# Patient Record
Sex: FEMALE | Race: AMERICAN INDIAN OR ALASKA NATIVE | NOT HISPANIC OR LATINO | Employment: UNEMPLOYED | URBAN - METROPOLITAN AREA
[De-identification: names, ages, dates, MRNs, and addresses within clinical notes are randomized per-mention and may not be internally consistent; named-entity substitution may affect disease eponyms.]

---

## 2021-09-21 ENCOUNTER — OFFICE VISIT (OUTPATIENT)
Dept: FAMILY MEDICINE CLINIC | Facility: CLINIC | Age: 46
End: 2021-09-21
Payer: COMMERCIAL

## 2021-09-21 VITALS
HEIGHT: 66 IN | WEIGHT: 229.6 LBS | SYSTOLIC BLOOD PRESSURE: 122 MMHG | OXYGEN SATURATION: 98 % | HEART RATE: 87 BPM | RESPIRATION RATE: 16 BRPM | BODY MASS INDEX: 36.9 KG/M2 | DIASTOLIC BLOOD PRESSURE: 80 MMHG | TEMPERATURE: 98.7 F

## 2021-09-21 DIAGNOSIS — L50.9 HIVES: ICD-10-CM

## 2021-09-21 DIAGNOSIS — M25.50 MULTIPLE JOINT PAIN: Primary | ICD-10-CM

## 2021-09-21 DIAGNOSIS — Z80.8 FAMILY HISTORY OF THYROID CANCER: ICD-10-CM

## 2021-09-21 DIAGNOSIS — R10.9 ABDOMINAL PAIN, UNSPECIFIED ABDOMINAL LOCATION: ICD-10-CM

## 2021-09-21 PROCEDURE — 99204 OFFICE O/P NEW MOD 45 MIN: CPT | Performed by: FAMILY MEDICINE

## 2021-09-21 PROCEDURE — 3725F SCREEN DEPRESSION PERFORMED: CPT | Performed by: FAMILY MEDICINE

## 2021-09-21 PROCEDURE — 3008F BODY MASS INDEX DOCD: CPT | Performed by: FAMILY MEDICINE

## 2021-09-21 RX ORDER — METHYLPREDNISOLONE 4 MG/1
TABLET ORAL
Qty: 21 EACH | Refills: 0 | Status: SHIPPED | OUTPATIENT
Start: 2021-09-21 | End: 2021-10-05

## 2021-09-21 RX ORDER — CETIRIZINE HYDROCHLORIDE 10 MG/1
10 TABLET ORAL DAILY
COMMUNITY
End: 2021-10-27

## 2021-09-21 RX ORDER — PANTOPRAZOLE SODIUM 40 MG/1
40 TABLET, DELAYED RELEASE ORAL
Qty: 90 TABLET | Refills: 3 | Status: SHIPPED | OUTPATIENT
Start: 2021-09-21 | End: 2022-03-28

## 2021-09-21 NOTE — PROGRESS NOTES
Solo Chavarria 1975 female MRN: [id number]    [facility]      ASSESSMENT/PLAN  oSlo Chavarria is a 42 y o  female presents to the office for     Diagnoses and all orders for this visit:    Multiple joint pain  -     Sedimentation rate, automated; Future  -     Comprehensive metabolic panel; Future  -     CBC and differential; Future  -     LUCY Screen w/ Reflex to Titer/Pattern; Future    Abdominal pain, unspecified abdominal location  -     Comprehensive metabolic panel; Future  -     CBC and differential; Future  -     Ambulatory referral to Gastroenterology; Future  -     US abdomen complete; Future  -     pantoprazole (PROTONIX) 40 mg tablet; Take 1 tablet (40 mg total) by mouth daily before breakfast    Hives  -     Ambulatory referral to Dermatology; Future  -     methylPREDNISolone 4 MG tablet therapy pack; Use as directed on package    Family history of thyroid cancer  -     TSH, 3rd generation with Free T4 reflex; Future    Other orders  -     cetirizine (ZyrTEC) 10 mg tablet; Take 10 mg by mouth daily      Multiple joint pain-> r/o Autoimmune  Hive: COVID latent hives? Advised that I have had multiple patients with this  recommend short course of steriods, and continue zyrtec  No improvement then recommend Derm eval  Abdominal pain: Gallbladder vs Gastritis vs PUD  Started on Protonix         Health Maintence:  BMI Counseling: Body mass index is 37 06 kg/m²  The BMI is above normal  Nutrition recommendations include consuming healthier snacks  Rationale for BMI follow-up plan is due to patient being overweight or obese  Depression Screening and Follow-up Plan:   Patient was screened for depression during today's encounter  They screened negative with a PHQ-2 score of 2            Disposition: Return to the office in 2 weeks   Future Appointments   Date Time Provider Coco Monsalve   10/4/2021  8:00  [address] [facility]   10/5/2021 10:15 AM Ruslan Laureano MD [facility]   10/27/2021  9:30 AM Gregorio Morgan PA-C [facility]          SUBJECTIVE  CC: Abdominal Pain (abdominal pain after eating, has diarrhea and gas) and Urticaria (also complains of hives on-going for 2 months)      HPI:  Juan Miguel Jeff is a 42 y o  femalepresenting to the office for a follow up on  Over 2 months today,  Starts at the back of her neck to the lower back  Only in the evening  Base of the neck and mid back, inner thighs  Bilateral cheek-> December> on and off for four years  Been more persistent for  2 years  Pimple and blisters  OTC Benadryl, Zyrtec    Stomach every now and then since 3rd baby  12 years ago had a problem with banana  March had a problem with banana and then stopped it after 3 days because it got stuck in epigastric  3 attacks-> 3rd was the worse felt that she was going to pass out  OTC nothing  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      Pain under thr right breast  Sore spot on the back of head  IUD-> Mirena  Vitamins    Review of Systems   Constitutional: Positive for appetite change  Negative for activity change, chills, fatigue and fever  HENT: Negative for congestion  Respiratory: Negative for cough, chest tightness and shortness of breath  Cardiovascular: Negative for chest pain and leg swelling  Gastrointestinal: Positive for abdominal pain  Negative for abdominal distention, constipation, diarrhea, nausea and vomiting  Musculoskeletal: Positive for arthralgias and myalgias  Skin: Positive for rash  All other systems reviewed and are negative  Historical Information   The patient history was reviewed as follows:    History reviewed  No pertinent past medical history    Past Surgical History:   Procedure Laterality Date    BREAST SURGERY       Family History   Problem Relation Age of Onset    Substance Abuse Maternal Aunt     Alcohol abuse Maternal Aunt     Pancreatic cancer Maternal Uncle       Social History Social History     Substance and Sexual Activity   Alcohol Use Not Currently     Social History     Substance and Sexual Activity   Drug Use Never     Social History     Tobacco Use   Smoking Status Never Smoker   Smokeless Tobacco Never Used       Medications:     Current Outpatient Medications:     cetirizine (ZyrTEC) 10 mg tablet, Take 10 mg by mouth daily, Disp: , Rfl:     methylPREDNISolone 4 MG tablet therapy pack, Use as directed on package, Disp: 21 each, Rfl: 0    pantoprazole (PROTONIX) 40 mg tablet, Take 1 tablet (40 mg total) by mouth daily before breakfast, Disp: 90 tablet, Rfl: 3  Allergies   Allergen Reactions    Sulfa Antibiotics Hives       OBJECTIVE    Vitals:   Vitals:    09/21/21 1228   BP: 122/80   BP Location: Left arm   Patient Position: Sitting   Cuff Size: Standard   Pulse: 87   Resp: 16   Temp: 98 7 °F (37 1 °C)   TempSrc: Temporal   SpO2: 98%   Weight: 104 kg (229 lb 9 6 oz)   Height: 5' 6" (1 676 m)           Physical Exam  Vitals reviewed  Constitutional:       Appearance: She is well-developed  HENT:      Head: Normocephalic and atraumatic  Eyes:      Conjunctiva/sclera: Conjunctivae normal       Pupils: Pupils are equal, round, and reactive to light  Cardiovascular:      Rate and Rhythm: Normal rate and regular rhythm  Heart sounds: Normal heart sounds  Pulmonary:      Effort: Pulmonary effort is normal  No respiratory distress  Breath sounds: Normal breath sounds  Abdominal:      Tenderness: There is no abdominal tenderness (not present today)  Musculoskeletal:         General: Normal range of motion  Cervical back: Normal range of motion and neck supple  Skin:     General: Skin is warm  Capillary Refill: Capillary refill takes less than 2 seconds  Neurological:      Mental Status: She is alert and oriented to person, place, and time              Cephus Meigs, MD  [facility]

## 2021-09-23 LAB
ALBUMIN SERPL-MCNC: 4.3 G/DL (ref 3.8–4.8)
ALBUMIN/GLOB SERPL: 1.7 {RATIO} (ref 1.2–2.2)
ALP SERPL-CCNC: 94 IU/L (ref 44–121)
ALT SERPL-CCNC: 27 IU/L (ref 0–32)
ANA SER QL: NEGATIVE
AST SERPL-CCNC: 23 IU/L (ref 0–40)
BASOPHILS # BLD AUTO: 0 X10E3/UL (ref 0–0.2)
BASOPHILS NFR BLD AUTO: 1 %
BILIRUB SERPL-MCNC: 0.3 MG/DL (ref 0–1.2)
BUN SERPL-MCNC: 8 MG/DL (ref 6–24)
BUN/CREAT SERPL: 10 (ref 9–23)
CALCIUM SERPL-MCNC: 8.9 MG/DL (ref 8.7–10.2)
CHLORIDE SERPL-SCNC: 104 MMOL/L (ref 96–106)
CO2 SERPL-SCNC: 23 MMOL/L (ref 20–29)
CREAT SERPL-MCNC: 0.84 MG/DL (ref 0.57–1)
EOSINOPHIL # BLD AUTO: 0.3 X10E3/UL (ref 0–0.4)
EOSINOPHIL NFR BLD AUTO: 6 %
ERYTHROCYTE [DISTWIDTH] IN BLOOD BY AUTOMATED COUNT: 13.2 % (ref 11.7–15.4)
ERYTHROCYTE [SEDIMENTATION RATE] IN BLOOD BY WESTERGREN METHOD: 14 MM/HR (ref 0–32)
GLOBULIN SER-MCNC: 2.6 G/DL (ref 1.5–4.5)
GLUCOSE SERPL-MCNC: 89 MG/DL (ref 65–99)
HCT VFR BLD AUTO: 37.7 % (ref 34–46.6)
HGB BLD-MCNC: 12.8 G/DL (ref 11.1–15.9)
IMM GRANULOCYTES # BLD: 0 X10E3/UL (ref 0–0.1)
IMM GRANULOCYTES NFR BLD: 0 %
LYMPHOCYTES # BLD AUTO: 2.3 X10E3/UL (ref 0.7–3.1)
LYMPHOCYTES NFR BLD AUTO: 41 %
MCH RBC QN AUTO: 31.5 PG (ref 26.6–33)
MCHC RBC AUTO-ENTMCNC: 34 G/DL (ref 31.5–35.7)
MCV RBC AUTO: 93 FL (ref 79–97)
MONOCYTES # BLD AUTO: 0.3 X10E3/UL (ref 0.1–0.9)
MONOCYTES NFR BLD AUTO: 6 %
NEUTROPHILS # BLD AUTO: 2.6 X10E3/UL (ref 1.4–7)
NEUTROPHILS NFR BLD AUTO: 46 %
PLATELET # BLD AUTO: 282 X10E3/UL (ref 150–450)
POTASSIUM SERPL-SCNC: 4.3 MMOL/L (ref 3.5–5.2)
PROT SERPL-MCNC: 6.9 G/DL (ref 6–8.5)
RBC # BLD AUTO: 4.06 X10E6/UL (ref 3.77–5.28)
SL AMB EGFR AFRICAN AMERICAN: 98 ML/MIN/1.73
SL AMB EGFR NON AFRICAN AMERICAN: 85 ML/MIN/1.73
SODIUM SERPL-SCNC: 140 MMOL/L (ref 134–144)
TSH SERPL DL<=0.005 MIU/L-ACNC: 3.63 UIU/ML (ref 0.45–4.5)
WBC # BLD AUTO: 5.6 X10E3/UL (ref 3.4–10.8)

## 2021-09-29 ENCOUNTER — TELEPHONE (OUTPATIENT)
Dept: FAMILY MEDICINE CLINIC | Facility: CLINIC | Age: 46
End: 2021-09-29

## 2021-09-29 NOTE — TELEPHONE ENCOUNTER
----- Message from Cherelle Moffett MD sent at 9/29/2021 12:47 PM EDT -----   Please advise the patient that all labs are within normal limits    We will discuss more at her appointment next week

## 2021-10-04 ENCOUNTER — HOSPITAL ENCOUNTER (OUTPATIENT)
Dept: RADIOLOGY | Facility: HOSPITAL | Age: 46
Discharge: HOME | End: 2021-10-04
Attending: FAMILY MEDICINE
Payer: COMMERCIAL

## 2021-10-04 DIAGNOSIS — R10.9 ABDOMINAL PAIN, UNSPECIFIED ABDOMINAL LOCATION: ICD-10-CM

## 2021-10-04 PROCEDURE — 76700 US EXAM ABDOM COMPLETE: CPT

## 2021-10-05 ENCOUNTER — OFFICE VISIT (OUTPATIENT)
Dept: FAMILY MEDICINE CLINIC | Facility: CLINIC | Age: 46
End: 2021-10-05
Payer: COMMERCIAL

## 2021-10-05 VITALS
TEMPERATURE: 97.9 F | RESPIRATION RATE: 16 BRPM | HEART RATE: 59 BPM | BODY MASS INDEX: 36.71 KG/M2 | HEIGHT: 66 IN | WEIGHT: 228.4 LBS | OXYGEN SATURATION: 99 % | SYSTOLIC BLOOD PRESSURE: 122 MMHG | DIASTOLIC BLOOD PRESSURE: 76 MMHG

## 2021-10-05 DIAGNOSIS — K80.80 BILIARY CALCULUS OF OTHER SITE WITHOUT OBSTRUCTION: Primary | ICD-10-CM

## 2021-10-05 DIAGNOSIS — Z12.31 SCREENING MAMMOGRAM FOR BREAST CANCER: ICD-10-CM

## 2021-10-05 DIAGNOSIS — R10.13 EPIGASTRIC PAIN: ICD-10-CM

## 2021-10-05 DIAGNOSIS — L50.9 HIVES: ICD-10-CM

## 2021-10-05 PROCEDURE — 99213 OFFICE O/P EST LOW 20 MIN: CPT | Performed by: FAMILY MEDICINE

## 2021-10-27 ENCOUNTER — CONSULT (OUTPATIENT)
Dept: GASTROENTEROLOGY | Facility: CLINIC | Age: 46
End: 2021-10-27
Payer: COMMERCIAL

## 2021-10-27 VITALS
TEMPERATURE: 97.6 F | HEIGHT: 66 IN | WEIGHT: 234 LBS | BODY MASS INDEX: 37.61 KG/M2 | SYSTOLIC BLOOD PRESSURE: 129 MMHG | DIASTOLIC BLOOD PRESSURE: 87 MMHG | HEART RATE: 79 BPM

## 2021-10-27 DIAGNOSIS — R19.8 IRREGULAR BOWEL HABITS: ICD-10-CM

## 2021-10-27 DIAGNOSIS — K80.20 CALCULUS OF GALLBLADDER WITHOUT CHOLECYSTITIS WITHOUT OBSTRUCTION: ICD-10-CM

## 2021-10-27 DIAGNOSIS — R10.9 ABDOMINAL PAIN, UNSPECIFIED ABDOMINAL LOCATION: ICD-10-CM

## 2021-10-27 DIAGNOSIS — R10.13 EPIGASTRIC PAIN: Primary | ICD-10-CM

## 2021-10-27 PROCEDURE — 99204 OFFICE O/P NEW MOD 45 MIN: CPT | Performed by: PHYSICIAN ASSISTANT

## 2021-10-27 PROCEDURE — 3008F BODY MASS INDEX DOCD: CPT | Performed by: PHYSICIAN ASSISTANT

## 2021-10-27 PROCEDURE — 1036F TOBACCO NON-USER: CPT | Performed by: PHYSICIAN ASSISTANT

## 2021-10-27 RX ORDER — SOD SULF/POT CHLORIDE/MAG SULF 1.479 G
TABLET ORAL
Qty: 24 TABLET | Refills: 0 | Status: SHIPPED | OUTPATIENT
Start: 2021-10-27 | End: 2021-11-29 | Stop reason: SDUPTHER

## 2021-11-23 ENCOUNTER — TELEPHONE (OUTPATIENT)
Dept: PREADMISSION TESTING | Facility: HOSPITAL | Age: 46
End: 2021-11-23

## 2021-11-23 ENCOUNTER — TELEPHONE (OUTPATIENT)
Dept: GASTROENTEROLOGY | Facility: CLINIC | Age: 46
End: 2021-11-23

## 2021-11-23 RX ORDER — DIPHENOXYLATE HYDROCHLORIDE AND ATROPINE SULFATE 2.5; .025 MG/1; MG/1
1 TABLET ORAL DAILY
COMMUNITY

## 2021-11-29 DIAGNOSIS — R10.13 EPIGASTRIC PAIN: ICD-10-CM

## 2021-11-29 DIAGNOSIS — K80.20 CALCULUS OF GALLBLADDER WITHOUT CHOLECYSTITIS WITHOUT OBSTRUCTION: ICD-10-CM

## 2021-11-29 DIAGNOSIS — R19.8 IRREGULAR BOWEL HABITS: ICD-10-CM

## 2021-11-29 RX ORDER — SOD SULF/POT CHLORIDE/MAG SULF 1.479 G
TABLET ORAL
Qty: 24 TABLET | Refills: 0 | Status: SHIPPED | OUTPATIENT
Start: 2021-11-29 | End: 2021-12-02 | Stop reason: ALTCHOICE

## 2021-12-01 RX ORDER — SODIUM CHLORIDE, SODIUM LACTATE, POTASSIUM CHLORIDE, CALCIUM CHLORIDE 600; 310; 30; 20 MG/100ML; MG/100ML; MG/100ML; MG/100ML
125 INJECTION, SOLUTION INTRAVENOUS CONTINUOUS
Status: CANCELLED | OUTPATIENT
Start: 2021-12-01

## 2021-12-02 ENCOUNTER — ANESTHESIA EVENT (OUTPATIENT)
Dept: GASTROENTEROLOGY | Facility: AMBULARY SURGERY CENTER | Age: 46
End: 2021-12-02

## 2021-12-02 ENCOUNTER — ANESTHESIA (OUTPATIENT)
Dept: GASTROENTEROLOGY | Facility: AMBULARY SURGERY CENTER | Age: 46
End: 2021-12-02

## 2021-12-02 ENCOUNTER — HOSPITAL ENCOUNTER (OUTPATIENT)
Dept: GASTROENTEROLOGY | Facility: AMBULARY SURGERY CENTER | Age: 46
Setting detail: OUTPATIENT SURGERY
Discharge: HOME | End: 2021-12-02
Attending: INTERNAL MEDICINE | Admitting: INTERNAL MEDICINE
Payer: COMMERCIAL

## 2021-12-02 VITALS
DIASTOLIC BLOOD PRESSURE: 74 MMHG | SYSTOLIC BLOOD PRESSURE: 124 MMHG | HEIGHT: 66 IN | RESPIRATION RATE: 16 BRPM | OXYGEN SATURATION: 100 % | HEART RATE: 69 BPM | BODY MASS INDEX: 37.61 KG/M2 | TEMPERATURE: 97.1 F | WEIGHT: 234 LBS

## 2021-12-02 DIAGNOSIS — K80.20 CALCULUS OF GALLBLADDER WITHOUT CHOLECYSTITIS WITHOUT OBSTRUCTION: ICD-10-CM

## 2021-12-02 DIAGNOSIS — R19.8 IRREGULAR BOWEL HABITS: ICD-10-CM

## 2021-12-02 DIAGNOSIS — R10.13 EPIGASTRIC PAIN: ICD-10-CM

## 2021-12-02 LAB
EXT PREGNANCY TEST URINE: NEGATIVE
EXT. CONTROL: NORMAL

## 2021-12-02 PROCEDURE — 88305 TISSUE EXAM BY PATHOLOGIST: CPT | Performed by: PATHOLOGY

## 2021-12-02 PROCEDURE — 88342 IMHCHEM/IMCYTCHM 1ST ANTB: CPT | Performed by: PATHOLOGY

## 2021-12-02 PROCEDURE — 81025 URINE PREGNANCY TEST: CPT | Performed by: ANESTHESIOLOGY

## 2021-12-02 PROCEDURE — 43239 EGD BIOPSY SINGLE/MULTIPLE: CPT | Performed by: INTERNAL MEDICINE

## 2021-12-02 PROCEDURE — 45378 DIAGNOSTIC COLONOSCOPY: CPT | Performed by: INTERNAL MEDICINE

## 2021-12-02 RX ORDER — PROPOFOL 10 MG/ML
INJECTION, EMULSION INTRAVENOUS CONTINUOUS PRN
Status: DISCONTINUED | OUTPATIENT
Start: 2021-12-02 | End: 2021-12-02

## 2021-12-02 RX ORDER — LIDOCAINE HYDROCHLORIDE 10 MG/ML
INJECTION, SOLUTION EPIDURAL; INFILTRATION; INTRACAUDAL; PERINEURAL AS NEEDED
Status: DISCONTINUED | OUTPATIENT
Start: 2021-12-02 | End: 2021-12-02

## 2021-12-02 RX ORDER — PROPOFOL 10 MG/ML
INJECTION, EMULSION INTRAVENOUS AS NEEDED
Status: DISCONTINUED | OUTPATIENT
Start: 2021-12-02 | End: 2021-12-02

## 2021-12-02 RX ORDER — SODIUM CHLORIDE, SODIUM LACTATE, POTASSIUM CHLORIDE, CALCIUM CHLORIDE 600; 310; 30; 20 MG/100ML; MG/100ML; MG/100ML; MG/100ML
125 INJECTION, SOLUTION INTRAVENOUS CONTINUOUS
Status: DISCONTINUED | OUTPATIENT
Start: 2021-12-02 | End: 2021-12-06 | Stop reason: HOSPADM

## 2021-12-02 RX ADMIN — PROPOFOL 50 MG: 10 INJECTION, EMULSION INTRAVENOUS at 12:44

## 2021-12-02 RX ADMIN — PROPOFOL 140 MCG/KG/MIN: 10 INJECTION, EMULSION INTRAVENOUS at 12:45

## 2021-12-02 RX ADMIN — PROPOFOL 150 MG: 10 INJECTION, EMULSION INTRAVENOUS at 12:40

## 2021-12-02 RX ADMIN — SODIUM CHLORIDE, SODIUM LACTATE, POTASSIUM CHLORIDE, AND CALCIUM CHLORIDE 125 ML/HR: .6; .31; .03; .02 INJECTION, SOLUTION INTRAVENOUS at 12:23

## 2021-12-02 RX ADMIN — LIDOCAINE HYDROCHLORIDE 50 MG: 10 INJECTION, SOLUTION EPIDURAL; INFILTRATION; INTRACAUDAL; PERINEURAL at 12:40

## 2021-12-10 ENCOUNTER — CONSULT (OUTPATIENT)
Dept: SURGERY | Facility: CLINIC | Age: 46
End: 2021-12-10
Payer: COMMERCIAL

## 2021-12-10 VITALS
BODY MASS INDEX: 36.96 KG/M2 | TEMPERATURE: 97.8 F | SYSTOLIC BLOOD PRESSURE: 114 MMHG | HEIGHT: 66 IN | DIASTOLIC BLOOD PRESSURE: 66 MMHG | WEIGHT: 230 LBS | HEART RATE: 64 BPM

## 2021-12-10 DIAGNOSIS — R19.8 IRREGULAR BOWEL HABITS: ICD-10-CM

## 2021-12-10 DIAGNOSIS — K80.20 CALCULUS OF GALLBLADDER WITHOUT CHOLECYSTITIS WITHOUT OBSTRUCTION: ICD-10-CM

## 2021-12-10 DIAGNOSIS — R10.13 EPIGASTRIC PAIN: ICD-10-CM

## 2021-12-10 DIAGNOSIS — K80.20 GALL STONES: Primary | ICD-10-CM

## 2021-12-10 DIAGNOSIS — Z86.79 HISTORY OF ATRIAL FLUTTER: ICD-10-CM

## 2021-12-10 PROBLEM — K59.1 FUNCTIONAL DIARRHEA: Status: ACTIVE | Noted: 2021-12-10

## 2021-12-10 PROCEDURE — 99244 OFF/OP CNSLTJ NEW/EST MOD 40: CPT | Performed by: SPECIALIST

## 2021-12-10 RX ORDER — CHLORHEXIDINE GLUCONATE 4 G/100ML
SOLUTION TOPICAL DAILY PRN
Status: CANCELLED | OUTPATIENT
Start: 2021-12-28

## 2021-12-10 RX ORDER — HEPARIN SODIUM 5000 [USP'U]/ML
5000 INJECTION, SOLUTION INTRAVENOUS; SUBCUTANEOUS EVERY 8 HOURS SCHEDULED
Status: CANCELLED | OUTPATIENT
Start: 2021-12-28

## 2021-12-10 RX ORDER — HEPARIN SODIUM 5000 [USP'U]/ML
5000 INJECTION, SOLUTION INTRAVENOUS; SUBCUTANEOUS ONCE
Status: CANCELLED | OUTPATIENT
Start: 2021-12-28 | End: 2021-12-10

## 2021-12-10 NOTE — H&P (VIEW-ONLY)
History and Physical Examination - General Surgery   [facility]  Kylee Fernandez 42 y o  female MRN: [id number]  Unit/Bed#:  Encounter: [id number] PCP: Maricruz Campuzano MD    History of Present Illness   Chief Complaint:   Right upper quadrant discomfort I am here for possible gallbladder surgery    HPI:  Kylee Fernandez is a 42 y o  female who presents to office with  History of a diarrhea and right upper quadrant pain disc  [place]  Had 1 episode of abdominal pain possible biliary colic    Patient has no food intolerance the except the her diarrhea gas was with the  Dairy products possible lactose intolerance   patient was seen by GI and workup with EGD colonoscopy and ultrasound revealed multiple gallstones and patient has been referred to us for possible gallbladder surgery     patient is taking Questran for diarrhea and avoiding milk products which is helping her     denies any nausea vomiting  Fever constipation/ has history of a atrial flutter was worked up few years ago and not on any medication//     only single dose vaccination for COVID 19    Historical Information   The following portions of the patient's history were reviewed and updated as appropriate  Past Medical History:   Diagnosis Date    Diarrhea     Gall stones     GERD (gastroesophageal reflux disease)      Past Surgical History:   Procedure Laterality Date    BREAST SURGERY Right     FRACTURE SURGERY Left     pins     Social History   Social History     Substance and Sexual Activity   Alcohol Use Not Currently    Comment: rarely     Social History     Substance and Sexual Activity   Drug Use Never     Social History     Tobacco Use   Smoking Status Never Smoker   Smokeless Tobacco Never Used     Family History:   Family History   Problem Relation Age of Onset    Substance Abuse Maternal Aunt     Alcohol abuse Maternal Aunt     Pancreatic cancer Maternal Uncle     KAT disease Mother        Meds/Allergies Allergies   Allergen Reactions    Sulfa Antibiotics Hives       Current Outpatient Medications:     multivitamin (THERAGRAN) TABS, Take 1 tablet by mouth daily, Disp: , Rfl:     pantoprazole (PROTONIX) 40 mg tablet, Take 1 tablet (40 mg total) by mouth daily before breakfast, Disp: 90 tablet, Rfl: 3     REVIEW OF SYSTEMS  Constitutional:  Denies fever or chills   Eyes:  Denies change in visual acuity   HENT:  Denies nasal congestion or sore throat   Respiratory:  Denies cough or shortness of breath   Cardiovascular:  Denies chest pain or edema   GI:  Denies abdominal pain, nausea, vomiting, bloody stools or diarrhea   :  Denies dysuria, frequency, difficulty in micturition and nocturia  Musculoskeletal:  Denies back pain or joint pain   Neurologic:  Denies headache, focal weakness or sensory changes   Endocrine:  Denies polyuria or polydipsia   Lymphatic:  Denies swollen glands   Psychiatric:  Denies depression or anxiety     Objective   Current Vitals:   /66 (BP Location: Left arm, Patient Position: Sitting, Cuff Size: Large)   Pulse 64   Temp 97 8 °F (36 6 °C) (Core)   Ht 5' 6" (1 676 m)   Wt 104 kg (230 lb)   LMP 11/25/2021   BMI 37 12 kg/m²   Body mass index is 37 12 kg/m²  PHYSICAL EXAMS  General:  Patient is not in acute distress, laying in the bed comfortably, awake, alert responding to commands,   HEENT:  Both pupils normal-size atraumatic, normocephalic, nonicteric  Neck:  JVP not raised  Trachea central  Respiratory:  normal Breath sounds clear to auscultation,  Cardiovascular:  S1-S2 normal without any murmur   GI:  Abdomen soft nontender   Liver and spleen normal size, no free fluid, hernial sites unremarkable without any cough impulse  Musculoskeletal:  No back pain  Integument:  No skin rashes or ulceration  Lymphatic:  No cervical lymphadenopathy  Neurologic:  Patient is awake alert, responding to command, well-oriented to time and place and person moving all extremities ambulating well    Visit Diagnosis:   Diagnoses and all orders for this visit:    Rosa Hench stones    Epigastric pain  -     Ambulatory referral to General Surgery    Calculus of gallbladder without cholecystitis without obstruction  -     Ambulatory referral to General Surgery    Irregular bowel habits  -     Ambulatory referral to General Surgery       Plan of care was discussed with patient in detail    Pertinent labs reviewed  Pertinent images and available reads personally reviewed  Procedure: EGD    Result Date: 12/2/2021  Narrative: [address] [place] [address] [phone] DATE OF SERVICE: 12/02/21 PHYSICIAN(S): Penny Acevedo MD - Attending Physician INDICATION: Epigastric pain, Calculus of gallbladder without cholecystitis without obstruction, Irregular bowel habits POST-OP DIAGNOSIS: See the impression below  PREPROCEDURE: Informed consent was obtained for the procedure, including sedation  Risks of perforation, hemorrhage, adverse drug reaction and aspiration were discussed  The patient was placed in the left lateral decubitus position  Patient was explained about the risks and benefits of the procedure  Risks including but not limited to bleeding, infection, and perforation were explained in detail  Also explained about less than 100% sensitivity with the exam and other alternatives  DETAILS OF PROCEDURE: Patient was taken to the procedure room where a time out was performed to confirm correct patient and correct procedure  The patient underwent monitored anesthesia care, which was administered by an anesthesia professional  The patient's blood pressure, heart rate, level of consciousness, respirations and oxygen were monitored throughout the procedure  The scope was advanced to the second part of the duodenum  Retroflexion was performed in the fundus  The patient experienced no blood loss  The procedure was not difficult  The patient tolerated the procedure well  There were no apparent complications  ANESTHESIA INFORMATION: ASA: II Anesthesia Type: IV Sedation with Anesthesia MEDICATIONS: No administrations occurring from 1230 to 1254 on 12/02/21 FINDINGS: The esophagus appeared normal  Stomach-mild erythema was noted  Biopsies done to check for H pylori  The duodenum appeared normal  SPECIMENS: ID Type Source Tests Collected by Time Destination 1 : GASTRIC BX Tissue Stomach TISSUE EXAM Pooja Herrera MD 12/2/2021 12:42 PM  2 : DUODENAL BX Tissue Duodenum TISSUE EXAM Pooja Herrera MD 12/2/2021 12:42 PM  3 : RANDOM COLON BX Tissue Colon TISSUE EXAM Pooja Herrera MD 12/2/2021 12:49 PM      Impression: The esophagus appeared normal  Stomach-mild erythema was noted  Biopsies done to check for H pylori  The duodenum appeared normal  RECOMMENDATION: Await pathology results Continue protonix  Pooja Herrera MD     Procedure: Colonoscopy    Addendum Date: 12/2/2021 Addendum:   [address] [place] [id number] [phone] DATE OF SERVICE: 12/02/21 PHYSICIAN(S): Pooja Herrera MD - Attending Physician INDICATION: Epigastric pain, Irregular bowel habits Colonoscopy performed for a diagnostic indication  POST-OP DIAGNOSIS: See the impression below  HISTORY: Prior colonoscopy: No prior colonoscopy  BOWEL PREPARATION: Sutab PREPROCEDURE: Informed consent was obtained for the procedure, including sedation  Risks including but not limited to bleeding, infection, perforation, adverse drug reaction and aspiration were explained in detail  Also explained about less than 100% sensitivity with the exam and other alternatives  The patient was placed in the left lateral decubitus position  DETAILS OF PROCEDURE: Patient was taken to the procedure room where a time out was performed to confirm correct patient and correct procedure   The patient underwent monitored anesthesia care, which was administered by an anesthesia professional  The patient's blood pressure, heart rate, level of consciousness, oxygen and respirations were monitored throughout the procedure  A digital rectal exam was performed  The scope was introduced through the anus and advanced to the terminal ileum  Retroflexion was performed in the rectum  The quality of bowel preparation was evaluated using the [facility] Bowel Preparation Scale with scores of: right colon = 2, transverse colon = 2, left colon = 2  The total BBPS score was 6  Bowel prep was adequate  The patient experienced no blood loss  The procedure was not difficult  The patient tolerated the procedure well  There were no apparent complications  ANESTHESIA INFORMATION: ASA: II Anesthesia Type: IV Sedation with Anesthesia MEDICATIONS: No administrations occurring from 1230 to 1254 on 12/02/21 FINDINGS: Normal terminal ileum The cecum appeared normal  Colon- couple of diverticula in the ascending colon and few small diverticula in the sigmoid colon  Mucosa appeared normal   Random biopsies done to check for microscopic colitis EVENTS: Procedure Events Event Event Time ENDO CECUM REACHED 12/2/2021 12:46 PM ENDO SCOPE OUT TIME 12/2/2021 12:52 PM ENDO SCOPE OUT TIME 12/2/2021 12:54 PM SPECIMENS: ID Type Source Tests Collected by Time Destination 1 : GASTRIC BX Tissue Stomach TISSUE EXAM Karol Alvarado MD 12/2/2021 12:42 PM  2 : DUODENAL BX Tissue Duodenum TISSUE EXAM Karol Alvarado MD 12/2/2021 12:42 PM  3 : [address] Tissue Colon TISSUE EXAM Karol Alvarado MD 12/2/2021 12:49 PM  EQUIPMENT: Colonoscope - IMPRESSION: Normal terminal ileum The cecum appeared normal  Colon- couple of diverticula in the ascending colon and few small diverticula in the sigmoid colon  Mucosa appeared normal   Random biopsies done to check for microscopic colitis RECOMMENDATION: Repeat screening colonoscopy in 10 years   Try Nini meier  For diarrhea Check celiac disease panel Karol Alvarado MD     Result Date: 12/2/2021  Narrative:  [id number] [phone] DATE OF SERVICE: 12/02/21 PHYSICIAN(S): Hernan Forbes MD - Attending Physician INDICATION: Epigastric pain, Irregular bowel habits Colonoscopy performed for a diagnostic indication  POST-OP DIAGNOSIS: See the impression below  HISTORY: Prior colonoscopy: No prior colonoscopy  BOWEL PREPARATION: Sutab PREPROCEDURE: Informed consent was obtained for the procedure, including sedation  Risks including but not limited to bleeding, infection, perforation, adverse drug reaction and aspiration were explained in detail  Also explained about less than 100% sensitivity with the exam and other alternatives  The patient was placed in the left lateral decubitus position  DETAILS OF PROCEDURE: Patient was taken to the procedure room where a time out was performed to confirm correct patient and correct procedure  The patient underwent monitored anesthesia care, which was administered by an anesthesia professional  The patient's blood pressure, heart rate, level of consciousness, oxygen and respirations were monitored throughout the procedure  A digital rectal exam was performed  The scope was introduced through the anus and advanced to the terminal ileum  Retroflexion was performed in the rectum  The quality of bowel preparation was evaluated using the [facility] Bowel Preparation Scale with scores of: right colon = 2, transverse colon = 2, left colon = 2  The total BBPS score was 6  Bowel prep was adequate  The patient experienced no blood loss  The procedure was not difficult  The patient tolerated the procedure well  There were no apparent complications  ANESTHESIA INFORMATION: ASA: II Anesthesia Type: IV Sedation with Anesthesia MEDICATIONS: No administrations occurring from 1230 to 1254 on 12/02/21 FINDINGS: Normal terminal ileum The cecum appeared normal  Colon- couple of diverticula in the ascending colon and few small diverticula in the sigmoid colon    Mucosa appeared normal   Random biopsies done to check for microscopic colitis EVENTS: Procedure Events Event Event Time ENDO CECUM REACHED 12/2/2021 12:46 PM ENDO SCOPE OUT TIME 12/2/2021 12:52 PM ENDO SCOPE OUT TIME 12/2/2021 12:54 PM SPECIMENS: ID Type Source Tests Collected by Time Destination 1 : GASTRIC BX Tissue Stomach TISSUE EXAM James Ness MD 12/2/2021 12:42 PM  2 : DUODENAL BX Tissue Duodenum TISSUE EXAM James Ness MD 12/2/2021 12:42 PM  3 : [address] Tissue Colon TISSUE EXAM James Ness MD 12/2/2021 12:49 PM  EQUIPMENT: Colonoscope -     Impression: Normal terminal ileum The cecum appeared normal  Colon- couple of diverticula in the ascending colon and few small diverticula in the sigmoid colon  Mucosa appeared normal   Random biopsies done to check for microscopic colitis RECOMMENDATION: Repeat screening colonoscopy in 10 years   Try Questran p r n  For diarrhea James Ness MD     Pertinent notes reviewed    Assessment/Plan   Assessment:   gallstones on ultrasound   obesity BMI 37 12   partially vaccinated for COVID   history of atrial flutter on not on any medication   unexplained diarrhea  Plan:  Proper consent was obtained  The risks, benefits, alternatives,and probabilities of success were discussed in detail with no guarantee made as to outcome  All questions were answered to the patient's satisfaction  Preoperative prep was explained to the patient  Will repeat the blood work CBC CMP EKG prior to surgery   cardiology consult in view of a previous history of atrial flutter per patient   risk of diarrhea getting worse was explained to the patient in detail All made get better if it is because of indigestion and lactose intolerant     scheduled for surgery    Counseling / Coordination of Care  Expained patient in detailed about coordination of care   A description of the counseling / coordination of care:  I performed an interim history, pertinent images and labs, performed a physical examination to arrive at the plan delineated above with associated thought processes  Dr Leela Park MD [place]    Office   Tel  [phone]  Fax   [phone]

## 2021-12-15 ENCOUNTER — CONSULT (OUTPATIENT)
Dept: CARDIOLOGY CLINIC | Facility: CLINIC | Age: 46
End: 2021-12-15
Payer: COMMERCIAL

## 2021-12-15 VITALS
BODY MASS INDEX: 36.96 KG/M2 | OXYGEN SATURATION: 97 % | DIASTOLIC BLOOD PRESSURE: 70 MMHG | SYSTOLIC BLOOD PRESSURE: 110 MMHG | WEIGHT: 230 LBS | HEART RATE: 71 BPM | HEIGHT: 66 IN | TEMPERATURE: 98.4 F

## 2021-12-15 DIAGNOSIS — R07.89 CHEST DISCOMFORT: ICD-10-CM

## 2021-12-15 DIAGNOSIS — K80.20 CALCULUS OF GALLBLADDER WITHOUT CHOLECYSTITIS WITHOUT OBSTRUCTION: ICD-10-CM

## 2021-12-15 DIAGNOSIS — K80.20 GALL STONES: ICD-10-CM

## 2021-12-15 DIAGNOSIS — Z01.810 PREOP CARDIOVASCULAR EXAM: ICD-10-CM

## 2021-12-15 DIAGNOSIS — Z86.79 HISTORY OF ATRIAL FLUTTER: ICD-10-CM

## 2021-12-15 PROCEDURE — 3008F BODY MASS INDEX DOCD: CPT | Performed by: INTERNAL MEDICINE

## 2021-12-15 PROCEDURE — 1036F TOBACCO NON-USER: CPT | Performed by: INTERNAL MEDICINE

## 2021-12-15 PROCEDURE — 99244 OFF/OP CNSLTJ NEW/EST MOD 40: CPT | Performed by: INTERNAL MEDICINE

## 2021-12-15 PROCEDURE — 93000 ELECTROCARDIOGRAM COMPLETE: CPT | Performed by: INTERNAL MEDICINE

## 2021-12-20 NOTE — PRE-PROCEDURE INSTRUCTIONS
My Surgical Experience    The following information was developed to assist you to prepare for your operation  What do I need to do before coming to the hospital?   Arrange for a responsible person to drive you to and from the hospital    Arrange care for your children at home  Children are not allowed in the recovery areas of the hospital   Plan to wear clothing that is easy to put on and take off  If you are having shoulder surgery, wear a shirt that buttons or zippers in the front  Bathing  o Shower the evening before and the morning of your surgery with an antibacterial soap  Please refer to the Pre Op Showering Instructions for Surgery Patients Sheet   o Remove nail polish and all body piercing jewelry  o Do not shave any body part for at least 24 hours before surgery-this includes face, arms, legs and upper body  Food  o Nothing to eat or drink after midnight the night before your surgery  This includes candy and chewing gum  o Exception: If your surgery is after 12:00pm (noon), you may have clear liquids such as 7-Up®, ginger ale, apple or cranberry juice, Jell-O®, water, or clear broth until 8:00 am  o Do not drink milk or juice with pulp on the morning before surgery  o Do not drink alcohol 24 hours before surgery  Medicine  o Follow instructions you received from your surgeon about which medicines you may take on the day of surgery  o If instructed to take medicine on the morning of surgery, take pills with just a small sip of water  Call your prescribing doctor for specific infroamtion on what to do if you take insulin    What should I bring to the hospital?    Bring:  Tammi Calvin or a walker, if you have them, for foot or knee surgery   A list of the daily medicines, vitamins, minerals, herbals and nutritional supplements you take   Include the dosages of medicines and the time you take them each day   Glasses, dentures or hearing aids   Minimal clothing; you will be wearing hospital sleepwear   Photo ID; required to verify your identity   If you have a Living Will or Power of , bring a copy of the documents   If you have an ostomy, bring an extra pouch and any supplies you use    Do not bring   Medicines or inhalers   Money, valuables or jewelry    What other information should I know about the day of surgery?  Notify your surgeons if you develop a cold, sore throat, cough, fever, rash or any other illness   Report to the Ambulatory Surgical/Same Day Surgery Unit   You will be instructed to stop at Registration only if you have not been pre-registered   Inform your  fi they do not stay that they will be asked by the staff to leave a phone number where they can be reached   Be available to be reached before surgery  In the event the operating room schedule changes, you may be asked to come in earlier or later than expected    *It is important to tell your doctor and others involved in your health care if you are taking or have been taking any non-prescription drugs, vitamins, minerals, herbals or other nutritional supplements  Any of these may interact with some food or medicines and cause a reaction      Pre-Surgery Instructions:   Medication Instructions    multivitamin (THERAGRAN) TABS Instructed patient per Anesthesia Guidelines   pantoprazole (PROTONIX) 40 mg tablet Instructed patient per Anesthesia Guidelines

## 2021-12-21 ENCOUNTER — APPOINTMENT (OUTPATIENT)
Dept: LAB | Facility: HOSPITAL | Age: 46
End: 2021-12-21
Attending: SPECIALIST
Payer: COMMERCIAL

## 2021-12-21 DIAGNOSIS — K80.20 CALCULUS OF GALLBLADDER WITHOUT CHOLECYSTITIS WITHOUT OBSTRUCTION: ICD-10-CM

## 2021-12-21 DIAGNOSIS — R19.8 IRREGULAR BOWEL HABITS: ICD-10-CM

## 2021-12-21 DIAGNOSIS — R10.13 EPIGASTRIC PAIN: ICD-10-CM

## 2021-12-21 DIAGNOSIS — K80.20 GALL STONES: ICD-10-CM

## 2021-12-21 LAB
ALBUMIN SERPL BCP-MCNC: 3.7 G/DL (ref 3.5–5)
ALP SERPL-CCNC: 87 U/L (ref 46–116)
ALT SERPL W P-5'-P-CCNC: 38 U/L (ref 12–78)
ANION GAP SERPL CALCULATED.3IONS-SCNC: 8 MMOL/L (ref 4–13)
AST SERPL W P-5'-P-CCNC: 18 U/L (ref 5–45)
B-HCG SERPL-ACNC: <2 MIU/ML
BILIRUB SERPL-MCNC: 0.47 MG/DL (ref 0.2–1)
BUN SERPL-MCNC: 10 MG/DL (ref 5–25)
CALCIUM SERPL-MCNC: 8.3 MG/DL (ref 8.3–10.1)
CHLORIDE SERPL-SCNC: 107 MMOL/L (ref 100–108)
CO2 SERPL-SCNC: 26 MMOL/L (ref 21–32)
CREAT SERPL-MCNC: 1.03 MG/DL (ref 0.6–1.3)
ERYTHROCYTE [DISTWIDTH] IN BLOOD BY AUTOMATED COUNT: 12.9 % (ref 11.6–15.1)
GFR SERPL CREATININE-BSD FRML MDRD: 66 ML/MIN/1.73SQ M
GLUCOSE P FAST SERPL-MCNC: 95 MG/DL (ref 65–99)
HCT VFR BLD AUTO: 38.1 % (ref 34.8–46.1)
HGB BLD-MCNC: 12.4 G/DL (ref 11.5–15.4)
MCH RBC QN AUTO: 31.9 PG (ref 26.8–34.3)
MCHC RBC AUTO-ENTMCNC: 32.5 G/DL (ref 31.4–37.4)
MCV RBC AUTO: 98 FL (ref 82–98)
PLATELET # BLD AUTO: 320 THOUSANDS/UL (ref 149–390)
PMV BLD AUTO: 8.8 FL (ref 8.9–12.7)
POTASSIUM SERPL-SCNC: 3.8 MMOL/L (ref 3.5–5.3)
PROT SERPL-MCNC: 7.3 G/DL (ref 6.4–8.2)
RBC # BLD AUTO: 3.89 MILLION/UL (ref 3.81–5.12)
SODIUM SERPL-SCNC: 141 MMOL/L (ref 136–145)
WBC # BLD AUTO: 4.7 THOUSAND/UL (ref 4.31–10.16)

## 2021-12-21 PROCEDURE — 80053 COMPREHEN METABOLIC PANEL: CPT

## 2021-12-21 PROCEDURE — 84702 CHORIONIC GONADOTROPIN TEST: CPT

## 2021-12-21 PROCEDURE — 85027 COMPLETE CBC AUTOMATED: CPT

## 2021-12-21 PROCEDURE — 36415 COLL VENOUS BLD VENIPUNCTURE: CPT

## 2021-12-23 ENCOUNTER — HOSPITAL ENCOUNTER (OUTPATIENT)
Dept: NON INVASIVE DIAGNOSTICS | Facility: CLINIC | Age: 46
Discharge: HOME | End: 2021-12-23
Payer: COMMERCIAL

## 2021-12-23 ENCOUNTER — TELEPHONE (OUTPATIENT)
Dept: CARDIOLOGY CLINIC | Facility: CLINIC | Age: 46
End: 2021-12-23

## 2021-12-23 DIAGNOSIS — K80.20 CALCULUS OF GALLBLADDER WITHOUT CHOLECYSTITIS WITHOUT OBSTRUCTION: ICD-10-CM

## 2021-12-23 DIAGNOSIS — K80.20 GALL STONES: ICD-10-CM

## 2021-12-23 DIAGNOSIS — Z86.79 HISTORY OF ATRIAL FLUTTER: ICD-10-CM

## 2021-12-23 DIAGNOSIS — R07.89 CHEST DISCOMFORT: ICD-10-CM

## 2021-12-23 DIAGNOSIS — Z01.810 PREOP CARDIOVASCULAR EXAM: ICD-10-CM

## 2021-12-23 LAB
BASELINE ST DEPRESSION: 0 MM
MAX HR PERCENT: 90 %
MAX HR: 149 BPM
RATE PRESSURE PRODUCT: NORMAL
SL CV STRESS RECOVERY BP: NORMAL MMHG
SL CV STRESS RECOVERY HR: 92 BPM
SL CV STRESS RECOVERY O2 SAT: 98 %
SL CV STRESS STAGE REACHED: 4
STRESS ANGINA INDEX: 0
STRESS BASELINE BP: NORMAL MMHG
STRESS BASELINE HR: 67 BPM
STRESS DUKE TREADMILL SCORE: 9
STRESS O2 SAT REST: 99 %
STRESS PEAK HR: 160 BPM
STRESS PERCENT HR: 96 %
STRESS POST ESTIMATED WORKLOAD: 10.1 METS
STRESS POST EXERCISE DUR MIN: 9 MIN
STRESS POST EXERCISE DUR SEC: 0 SEC
STRESS POST O2 SAT PEAK: 96 %
STRESS POST PEAK BP: 152 MMHG
STRESS ST DEPRESSION: 0 MM
STRESS TARGET HR: 160 BPM

## 2021-12-23 PROCEDURE — 93017 CV STRESS TEST TRACING ONLY: CPT

## 2021-12-23 PROCEDURE — 93018 CV STRESS TEST I&R ONLY: CPT | Performed by: INTERNAL MEDICINE

## 2021-12-23 PROCEDURE — 93016 CV STRESS TEST SUPVJ ONLY: CPT | Performed by: INTERNAL MEDICINE

## 2021-12-23 NOTE — TELEPHONE ENCOUNTER
Pre  Op  Clearance note- Cardiology    Mil Swain   42 y o   female  1975      MD Mli Evans :     Patient's chart was reviewed for preop clearance  Patient was seen in our office on 12/15/21  Patient has past medical history significant for atypical chest pain, gallstone  Patient is now scheduled for Cholecystectomy on 12/28/21  Patient has no clinical evidence of  active heart failure or  active ischemia or active arrhythmia  Patient's last cardiac workup including exercise stress test done December 2021 reports were reviewed and it shows normal exercise capacity with no abnormality noted  In my opinion patient is in optimum condition for the procedure as planned  Patient is low risk for the surgery as planned from cardiac point of view  Continue current cardiac medications  Patient can hold  Aspirin for  5-7 days as required for surgery  Patient can hold Plavix for 5 days  Patient can hold Eliquis/Xarelto/Pradaxa for 3 days before the procedure  Please restart after the procedure  immediately or next day if no contraindication form surgical point of view and advise patient to contact our office  If you have any question please do not hesitate to call us at our office of [facility]  Phone # [phone]        Lab Results   Component Value Date    WBC 4 70 12/21/2021    HGB 12 4 12/21/2021    HCT 38 1 12/21/2021    MCV 98 12/21/2021     12/21/2021     Lab Results   Component Value Date    CREATININE 1 03 12/21/2021     Lab Results   Component Value Date    GLUF 95 12/21/2021       Cardiac testing:   Exercise stress test done recently shows good exercise capacity        Judge Pascual MD [facility]  12/23/2021  3:18 PM

## 2021-12-27 ENCOUNTER — TELEPHONE (OUTPATIENT)
Dept: CARDIOLOGY CLINIC | Facility: CLINIC | Age: 46
End: 2021-12-27

## 2021-12-27 ENCOUNTER — ANESTHESIA EVENT (OUTPATIENT)
Dept: PERIOP | Facility: HOSPITAL | Age: 46
End: 2021-12-27
Payer: COMMERCIAL

## 2021-12-28 ENCOUNTER — HOSPITAL ENCOUNTER (OUTPATIENT)
Facility: HOSPITAL | Age: 46
Setting detail: OUTPATIENT SURGERY
Discharge: HOME | End: 2021-12-28
Attending: SPECIALIST | Admitting: SPECIALIST
Payer: COMMERCIAL

## 2021-12-28 ENCOUNTER — ANESTHESIA (OUTPATIENT)
Dept: PERIOP | Facility: HOSPITAL | Age: 46
End: 2021-12-28
Payer: COMMERCIAL

## 2021-12-28 VITALS
BODY MASS INDEX: 36.77 KG/M2 | HEART RATE: 76 BPM | RESPIRATION RATE: 18 BRPM | TEMPERATURE: 97.3 F | OXYGEN SATURATION: 100 % | WEIGHT: 228.8 LBS | DIASTOLIC BLOOD PRESSURE: 67 MMHG | HEIGHT: 66 IN | SYSTOLIC BLOOD PRESSURE: 125 MMHG

## 2021-12-28 DIAGNOSIS — K80.20 GALL STONES: ICD-10-CM

## 2021-12-28 DIAGNOSIS — R10.13 EPIGASTRIC PAIN: ICD-10-CM

## 2021-12-28 DIAGNOSIS — R19.8 IRREGULAR BOWEL HABITS: ICD-10-CM

## 2021-12-28 DIAGNOSIS — K80.20 CALCULUS OF GALLBLADDER WITHOUT CHOLECYSTITIS WITHOUT OBSTRUCTION: Primary | ICD-10-CM

## 2021-12-28 LAB
CHEST PAIN STATEMENT: NORMAL
EXT PREGNANCY TEST URINE: NEGATIVE
EXT. CONTROL: NORMAL
MAX DIASTOLIC BP: 60 MMHG
MAX HEART RATE: 160 BPM
MAX PREDICTED HEART RATE: 176 BPM
MAX. SYSTOLIC BP: 184 MMHG
PROTOCOL NAME: NORMAL
TARGET HR FORMULA: NORMAL
TEST INDICATION: NORMAL
TIME IN EXERCISE PHASE: NORMAL

## 2021-12-28 PROCEDURE — 88304 TISSUE EXAM BY PATHOLOGIST: CPT | Performed by: PATHOLOGY

## 2021-12-28 PROCEDURE — 81025 URINE PREGNANCY TEST: CPT | Performed by: SPECIALIST

## 2021-12-28 PROCEDURE — 47562 LAPAROSCOPIC CHOLECYSTECTOMY: CPT | Performed by: PHYSICIAN ASSISTANT

## 2021-12-28 PROCEDURE — 47562 LAPAROSCOPIC CHOLECYSTECTOMY: CPT | Performed by: SPECIALIST

## 2021-12-28 RX ORDER — FENTANYL CITRATE/PF 50 MCG/ML
50 SYRINGE (ML) INJECTION
Status: DISCONTINUED | OUTPATIENT
Start: 2021-12-28 | End: 2021-12-28 | Stop reason: HOSPADM

## 2021-12-28 RX ORDER — FENTANYL CITRATE 50 UG/ML
INJECTION, SOLUTION INTRAMUSCULAR; INTRAVENOUS AS NEEDED
Status: DISCONTINUED | OUTPATIENT
Start: 2021-12-28 | End: 2021-12-28

## 2021-12-28 RX ORDER — MIDAZOLAM HYDROCHLORIDE 2 MG/2ML
INJECTION, SOLUTION INTRAMUSCULAR; INTRAVENOUS AS NEEDED
Status: DISCONTINUED | OUTPATIENT
Start: 2021-12-28 | End: 2021-12-28

## 2021-12-28 RX ORDER — EPHEDRINE SULFATE 50 MG/ML
INJECTION INTRAVENOUS AS NEEDED
Status: DISCONTINUED | OUTPATIENT
Start: 2021-12-28 | End: 2021-12-28

## 2021-12-28 RX ORDER — ONDANSETRON 2 MG/ML
INJECTION INTRAMUSCULAR; INTRAVENOUS AS NEEDED
Status: DISCONTINUED | OUTPATIENT
Start: 2021-12-28 | End: 2021-12-28

## 2021-12-28 RX ORDER — OXYCODONE HYDROCHLORIDE AND ACETAMINOPHEN 5; 325 MG/1; MG/1
1 TABLET ORAL EVERY 4 HOURS PRN
Status: COMPLETED | OUTPATIENT
Start: 2021-12-28 | End: 2021-12-28

## 2021-12-28 RX ORDER — SODIUM CHLORIDE, SODIUM LACTATE, POTASSIUM CHLORIDE, CALCIUM CHLORIDE 600; 310; 30; 20 MG/100ML; MG/100ML; MG/100ML; MG/100ML
50 INJECTION, SOLUTION INTRAVENOUS CONTINUOUS
Status: CANCELLED | OUTPATIENT
Start: 2021-12-28

## 2021-12-28 RX ORDER — PROPOFOL 10 MG/ML
INJECTION, EMULSION INTRAVENOUS AS NEEDED
Status: DISCONTINUED | OUTPATIENT
Start: 2021-12-28 | End: 2021-12-28

## 2021-12-28 RX ORDER — LIDOCAINE HYDROCHLORIDE 10 MG/ML
INJECTION, SOLUTION EPIDURAL; INFILTRATION; INTRACAUDAL; PERINEURAL AS NEEDED
Status: DISCONTINUED | OUTPATIENT
Start: 2021-12-28 | End: 2021-12-28

## 2021-12-28 RX ORDER — DEXAMETHASONE SODIUM PHOSPHATE 4 MG/ML
INJECTION, SOLUTION INTRA-ARTICULAR; INTRALESIONAL; INTRAMUSCULAR; INTRAVENOUS; SOFT TISSUE AS NEEDED
Status: DISCONTINUED | OUTPATIENT
Start: 2021-12-28 | End: 2021-12-28

## 2021-12-28 RX ORDER — HEPARIN SODIUM 5000 [USP'U]/ML
5000 INJECTION, SOLUTION INTRAVENOUS; SUBCUTANEOUS ONCE
Status: COMPLETED | OUTPATIENT
Start: 2021-12-28 | End: 2021-12-28

## 2021-12-28 RX ORDER — ONDANSETRON 2 MG/ML
4 INJECTION INTRAMUSCULAR; INTRAVENOUS ONCE AS NEEDED
Status: DISCONTINUED | OUTPATIENT
Start: 2021-12-28 | End: 2021-12-28 | Stop reason: HOSPADM

## 2021-12-28 RX ORDER — CEFAZOLIN SODIUM 2 G/50ML
2000 SOLUTION INTRAVENOUS
Status: DISCONTINUED | OUTPATIENT
Start: 2021-12-28 | End: 2021-12-28 | Stop reason: HOSPADM

## 2021-12-28 RX ORDER — CEFAZOLIN SODIUM 2 G/50ML
SOLUTION INTRAVENOUS AS NEEDED
Status: DISCONTINUED | OUTPATIENT
Start: 2021-12-28 | End: 2021-12-28

## 2021-12-28 RX ORDER — CHLORHEXIDINE GLUCONATE 4 G/100ML
SOLUTION TOPICAL DAILY PRN
Status: DISCONTINUED | OUTPATIENT
Start: 2021-12-28 | End: 2021-12-28 | Stop reason: HOSPADM

## 2021-12-28 RX ORDER — ROCURONIUM BROMIDE 10 MG/ML
INJECTION, SOLUTION INTRAVENOUS AS NEEDED
Status: DISCONTINUED | OUTPATIENT
Start: 2021-12-28 | End: 2021-12-28

## 2021-12-28 RX ORDER — BUPIVACAINE HYDROCHLORIDE AND EPINEPHRINE 5; 5 MG/ML; UG/ML
INJECTION, SOLUTION EPIDURAL; INTRACAUDAL; PERINEURAL AS NEEDED
Status: DISCONTINUED | OUTPATIENT
Start: 2021-12-28 | End: 2021-12-28 | Stop reason: HOSPADM

## 2021-12-28 RX ORDER — OXYCODONE HYDROCHLORIDE AND ACETAMINOPHEN 5; 325 MG/1; MG/1
1 TABLET ORAL EVERY 4 HOURS PRN
Qty: 12 TABLET | Refills: 0 | Status: SHIPPED | OUTPATIENT
Start: 2021-12-28 | End: 2021-12-31

## 2021-12-28 RX ORDER — SODIUM CHLORIDE, SODIUM LACTATE, POTASSIUM CHLORIDE, CALCIUM CHLORIDE 600; 310; 30; 20 MG/100ML; MG/100ML; MG/100ML; MG/100ML
INJECTION, SOLUTION INTRAVENOUS CONTINUOUS PRN
Status: DISCONTINUED | OUTPATIENT
Start: 2021-12-28 | End: 2021-12-28

## 2021-12-28 RX ORDER — HEPARIN SODIUM 5000 [USP'U]/ML
5000 INJECTION, SOLUTION INTRAVENOUS; SUBCUTANEOUS EVERY 8 HOURS SCHEDULED
Status: DISCONTINUED | OUTPATIENT
Start: 2021-12-28 | End: 2021-12-28

## 2021-12-28 RX ADMIN — HEPARIN SODIUM 5000 UNITS: 5000 INJECTION INTRAVENOUS; SUBCUTANEOUS at 07:59

## 2021-12-28 RX ADMIN — LIDOCAINE HYDROCHLORIDE 40 MG: 10 INJECTION, SOLUTION EPIDURAL; INFILTRATION; INTRACAUDAL; PERINEURAL at 09:20

## 2021-12-28 RX ADMIN — ROCURONIUM BROMIDE 50 MG: 10 INJECTION, SOLUTION INTRAVENOUS at 09:20

## 2021-12-28 RX ADMIN — DEXAMETHASONE SODIUM PHOSPHATE 8 MG: 4 INJECTION, SOLUTION INTRA-ARTICULAR; INTRALESIONAL; INTRAMUSCULAR; INTRAVENOUS; SOFT TISSUE at 09:26

## 2021-12-28 RX ADMIN — FENTANYL CITRATE 50 MCG: 50 INJECTION, SOLUTION INTRAMUSCULAR; INTRAVENOUS at 11:30

## 2021-12-28 RX ADMIN — FENTANYL CITRATE 50 MCG: 50 INJECTION, SOLUTION INTRAMUSCULAR; INTRAVENOUS at 10:56

## 2021-12-28 RX ADMIN — SUGAMMADEX 200 MG: 100 INJECTION, SOLUTION INTRAVENOUS at 10:25

## 2021-12-28 RX ADMIN — SODIUM CHLORIDE, SODIUM LACTATE, POTASSIUM CHLORIDE, AND CALCIUM CHLORIDE: .6; .31; .03; .02 INJECTION, SOLUTION INTRAVENOUS at 09:14

## 2021-12-28 RX ADMIN — FENTANYL CITRATE 100 MCG: 50 INJECTION, SOLUTION INTRAMUSCULAR; INTRAVENOUS at 09:20

## 2021-12-28 RX ADMIN — CEFAZOLIN SODIUM 2000 MG: 2 SOLUTION INTRAVENOUS at 09:22

## 2021-12-28 RX ADMIN — OXYCODONE HYDROCHLORIDE AND ACETAMINOPHEN 1 TABLET: 5; 325 TABLET ORAL at 11:47

## 2021-12-28 RX ADMIN — PROPOFOL 200 MG: 10 INJECTION, EMULSION INTRAVENOUS at 09:20

## 2021-12-28 RX ADMIN — ROCURONIUM BROMIDE 20 MG: 10 INJECTION, SOLUTION INTRAVENOUS at 09:58

## 2021-12-28 RX ADMIN — EPHEDRINE SULFATE 10 MG: 50 INJECTION, SOLUTION INTRAVENOUS at 10:03

## 2021-12-28 RX ADMIN — ONDANSETRON 4 MG: 2 INJECTION INTRAMUSCULAR; INTRAVENOUS at 09:26

## 2021-12-28 RX ADMIN — MIDAZOLAM 2 MG: 1 INJECTION INTRAMUSCULAR; INTRAVENOUS at 09:14

## 2021-12-28 NOTE — PERIOPERATIVE NURSING NOTE
Patient assisted OOB to bathroom, voided without difficulty, patient coughing, deep breathing and splinting after instruction by RN  Patient states pain is 4 out of 10  Discharge instructions given verbally and written to  and patient  Both receptive  Patient hungry given crackers and ginger ale and tolerating

## 2021-12-28 NOTE — PERIOPERATIVE NURSING NOTE
Received patient from PACU in room 9 , patient is alert and awake, VSS,  no distress noted,surgical dressings clean, dry and intact to abd  Patient is tolerating PO fluids, call bell placed in reach,  at bedside, will continue to monitor patient until d/c criteria met

## 2021-12-28 NOTE — INTERVAL H&P NOTE
H&P reviewed  After examining the patient I find no changes in the patients condition since the H&P had been written      Vitals:    12/28/21 0754   BP: 133/69   Pulse: 77   Resp: 18   Temp: 98 7 °F (37 1 °C)   SpO2: 98%   Patient examined seen by Dr Minnie Lantigua  Gallstones symptoms  Functional diarrhea  Off Protonix  Consent discussed with patient patient has been at bedside and postop care was discussed in detail all questions answered to the satisfaction  Labs reviewed  Site was marked  IV antibiotics and heparin ordered  Cardiology reviewed noted

## 2021-12-28 NOTE — OP NOTE
General SurgeryCHOLECYSTECTOMY LAPAROSCOPIC Op Note    Marv Jones  12/28/2021    Pre-op Diagnosis:   Epigastric pain [R10 13]  Calculus of gallbladder without cholecystitis without obstruction [K80 20]  Irregular bowel habits [R19 8]  Gall stones [K80 20]    PMH  Past Medical History:   Diagnosis Date    Diarrhea     Gall stones     GERD (gastroesophageal reflux disease)     Irregular heart beat     had some issues 2017 and had a cardiac workup done including 2 stress tests which were negative,       Post-op Diagnosis:   Patient Active Problem List   Diagnosis    Gall stones    Functional diarrhea       Procedure(s):  CHOLECYSTECTOMY LAPAROSCOPIC  Surgeon(s):  MD Lashonda Christiansen PA-C    Anesthesia:  General    Operative Findings:  Distended gallbladder  Markedly enlarged liver smooth surface  Large gallstone neck of the gallbladder       Assistant:  Mr Rubina Finney of [employer] was utilized as a first assistant as there is no surgical residency program at [facility]    Staff:   Circulator: Zainab Rose RN  Relief Circulator: Celina Hoff RN  Relief Scrub: Celina Hoff RN  Scrub Person: Zhanna Willard RN    Marv Jones was identified by me  Proper consent was obtained  The risks, benefits, alternatives,and probabilities of success were discussed in detail with no guarantee made as to outcome  All questions were answered to the patient's satisfaction  Site was marked prior coming to OR    Operative site was cleansed with ChloraPrep and draped in usual sterile fashion  Marv Jones was given pre-operative antibiotics  Body mass index is 36 93 kg/m²  Marv Blind is ASA 3 and Fire risk- 2  Marv Jones was re-identified in the OR  Site was identified and detailed timeout was performed with Anesthesia and OR staff  Supraumbilical transverse incision was made  The skin and subcutaneous tissue was cut along the line of incision    With open Stephanie technique a 10mm port was placed  Pneumoperitoneum was created with the pressure marked up to 15 mmHg and maintained during the procedure with high flow carbon dioxide  Gallbladder was identified  Three other ports were placed, one in the subxiphoid 10mm subcostal, one 5mm midclavicular subcostal and other one 5mm ant axillary subcostal  Gallbladder was grasped with a grasper introduced from the lateralmost 5 mm port at the fundus and was retracted upward laterally  Another grasper was introduced from the midclavicular port 5 mm and Coon's pouch of the gallbladder was grasped and was retracted downward and laterally   Patient has a very dilated short cystic duct  The infundibulum of the gallbladder and cystic duct were clearly identified as well as the cystic artery  After obtaining the critical view, Cystic duct was doubly clipped and cut between the clips  Cystic artery was doubly clipped and cut between the clips  Gallbladder was then removed from the gallbladder bed with Bovie dissection  Proper hemostasis was achieved  No active bleeding was noted  The gallbladder was then placed in the Endo pouch and was delivered through the Supraraumbilical transverse incision  All the 10 mm port sites were closed in double layers with figure of 8-0 Vicryl, 2-0 and the PDS 0 interrupted suture for the sheath and the skin was approximated with subcuticular Monocryl  Remaining all 5 mm ports were stitch with a single layer with subcuticular Monocryl skin approximation All the ports site were thoroughly infiltrated with Marcaine with Epinephrine  Sterile dressing was applied to all port site incisions    Yancy Marie tolerated the procedure well, remain stable during and after the procedure  At the end of the procedure all the instruments, needle and sponge counts were noted to be correct  Sterile dresing was applied and patient was transfered to recovery area in stable condition       Estimated Blood Loss:  Minimal    Specimens:  Order Name Source Comment Collection Info Order Time   TISSUE EXAM Gallbladder  Collected By: Willis Koenig MD 12/28/2021  9:44 AM     Release to patient through Mychart   Immediate              Drains:  * No LDAs found *    Complications:  None    Total OR Time  * Missing case tracking time(s) *   or    I was present for the entire procedure No qualified resident was available  A physician's assistant was required due to the intensity of the surgery  Physician assistant was required for camera operation, hemostasis retraction and the closure of the surgical wound  Physician assistant was present during the entire procedure      Patient Disposition:  PACU       Willis Koenig MD MS FRCS FACS  [facility]  Date: 12/28/2021  Time: 10:25 AM  Copy to PCP: Deyvi Dominguez MD

## 2021-12-28 NOTE — DISCHARGE INSTRUCTIONS
Please follow carefully all instructions checked below  Shubham Causey  Pre-op Diagnosis:   Epigastric pain [R10 13]  Calculus of gallbladder without cholecystitis without obstruction [K80 20]  Irregular bowel habits [R19 8]  Gall stones [K80 20]  Post-op Diagnosis:  Post-Op Diagnosis Codes:     * Epigastric pain [R10 13]     * Calculus of gallbladder without cholecystitis without obstruction [K80 20]     * Irregular bowel habits [R19 8]     * Gall stones [K80 20]  Procedure(s):  CHOLECYSTECTOMY LAPAROSCOPIC  Surgeon(s):  MD Serge Cook PA-C    1  Diet:  · Resume your normal diet  Avoid red meat eat lots of Plain natural yogurt or Probiotics   2  Medications:  · Home medications reviewed  Resume pre-operative medications unless noted below  · Prescription sent home with patient and Prescription sent over to pharmacy  · See after visit summary for reconciled discharge medications provided to patient and family  · Apply ice to incision area this will numb that area and it will prevent bruising and will minimize pain   3  Activities:  · Do NOT make important personal or business decisions for 24 hours  · Do NOT take the anticoagulation medicine like Eliquis Xarelto Coumadin for 24 hours after surgery  · Do NOT drive or operate machinery for 7 days  · While taking pain medication, do not drive and be careful as you walk or climb stairs  · Limit your activities for 3 weeks  Do not engage in sports, heavy work or heavy  lifting until your physician gives you permission  4  Wound Care:  · Change dressings as necessary after 2 days  · Keep dressings dry  5  Special Instructions:  · Full weight bearing  6  Bathing:  · May shower after 2 days  7  When to Call:  Call if fever, Nausea, vomiting, Constipation not feeling well, or wound infection, bleeding,reddness and excessive pain,  8  Follow-up Care:  · Make an appointment to see MD Jah Cook[address]  in 10 days for Follow up  Please Call Office  [phone] for appointment,       Evon Carter MD [address] [facility]:  [address]  [place]  Office - [phone]  Fax - [phone]  01/03/18  2:24 PM  Laparoscopic Cholecystectomy   AMBULATORY CARE:   What you need to know about a laparoscopic cholecystectomy:  Laparoscopic cholecystectomy is surgery to remove gallstones and your gallbladder  How to prepare for surgery:   · Your surgeon will tell you how to prepare  You may be told not to eat or drink anything after midnight on the day of surgery  Arrange to have someone drive you home after surgery and stay with you for 24 hours  · Tell your surgeon all the medicines you currently take  He or she will tell you if you need to stop any medicine for surgery, and when to stop  He or she will tell you which medicines to take or not take on the day of surgery  · You may need blood or urine tests  You may also need x-rays, an ultrasound, or a CT scan  Tell your surgeon if you had an allergic reaction to contrast liquid  Tell your surgeon about any allergies you have, including medicines and anesthesia  What will happen during surgery:   · Your surgeon will make between 1 and 4 small incisions in your abdomen or belly button  He or she will insert small tools into the incisions  Your abdomen will be filled with carbon dioxide gas to make it swell  This helps your surgeon see your organs better and gives more room to move the tools around  · Your surgeon will look for and remove gallstones in and around your gallbladder  X-rays or an ultrasound may be used  Your surgeon will remove your gallbladder through one of the incisions  The carbon dioxide will be released from your abdomen  The incisions will be closed with stitches, medical glue, or adhesive strips, then covered with bandages  What to expect after surgery:   You will be taken to a recovery room until you are fully awake  Healthcare providers will monitor you closely for any problems  Providers will help you walk around to prevent blood clots  You may be able to go home later the same day, or you may stay in the hospital overnight  · Pain, a sore throat, nausea, and vomiting are common after this surgery  These should get better within a few days  You may also have diarrhea that lasts up to a few months  · Medicines may be given to prevent or treat pain, nausea, and vomiting  Medicines may also be given to prevent a bacterial infection  Blood thinners may be given to prevent blood clots  You may be bleed or bruise more easily while you are taking blood thinners  · Your surgeon will tell you when to remove the bandages covering the surgery area  He or she will tell you when it is okay to start bathing  You may need to take showers instead of baths for a few days  · Your surgeon will tell you when you can drive, return to work, and do your regular daily activities  · You will be shown how to care for the surgery area and check for signs of infection  You will also be told which foods to eat in the days and weeks after surgery  Risks of a laparoscopic cholecystectomy:   · You could bleed more than expected or get an infection  Any carbon dioxide gas still in your body can cause neck and shoulder pain  Your gallbladder may leak bile into your abdomen during or after surgery  This can cause a severe infection or an abscess  · You may still have gallstones after surgery  You may need a different procedure to remove them  Your surgeon may need to make a larger incision than expected during surgery  Your bile duct, bowel, or other organs could be damaged during surgery  This can be life-threatening  Call your local emergency number (911 in the [address] [place] [address],[address]) if:   · You feel lightheaded, short of breath, and have chest pain  · You cough up blood      Seek care immediately if:   · Your arm or leg feels warm, tender, and painful  It may look swollen and red  · You cannot stop vomiting  · Your bowel movements are black or bloody  · You have pain in your abdomen and it is swollen or hard  · You have a fever over 101°F (38°C) or chills  Call your doctor or surgeon if:   · You have pain or nausea that is not relieved by medicine  · You have redness and swelling around your incision sites  · You have blood or pus leaking from your incision sites  · You are constipated, have diarrhea, or your bowel movements are pale  · Your skin or eyes are yellow  · You have questions or concerns about your surgery, condition, or care  Medicines: You may need any of the following:  · Prescription pain medicine  may be given  Ask your healthcare provider how to take this medicine safely  Some prescription pain medicines contain acetaminophen  Do not take other medicines that contain acetaminophen without talking to your healthcare provider  Too much acetaminophen may cause liver damage  Prescription pain medicine may cause constipation  Ask your healthcare provider how to prevent or treat constipation  · NSAIDs  help decrease swelling and pain or fever  This medicine is available with or without a doctor's order  NSAIDs can cause stomach bleeding or kidney problems in certain people  If you take blood thinner medicine, always ask your healthcare provider if NSAIDs are safe for you  Always read the medicine label and follow directions  · Take your medicine as directed  Contact your healthcare provider if you think your medicine is not helping or if you have side effects  Tell him or her if you are allergic to any medicine  Keep a list of the medicines, vitamins, and herbs you take  Include the amounts, and when and why you take them  Bring the list or the pill bottles to follow-up visits  Carry your medicine list with you in case of an emergency      Take deep breaths and cough 10 times each hour: This will decrease your risk for a lung infection  Take a deep breath and hold it for as long as you can  Then let the air out and cough strongly  You may be given an incentive spirometer to help you take deep breaths  Put the plastic piece in your mouth and take a slow, deep breath  Then let the air out and cough  Repeat these steps 10 times every hour  Care for the surgery area:   · Remove the bandages as directed  Your surgeon may tell you to remove the bandages the day after surgery  · Keep the area clean and dry  You may take a shower the day after your surgery  Do not take baths, swim, or soak in a hot tub until your surgeon says it is okay  · Check for signs of infection each day  Check the area for swelling, red streaks, or pus  Tell your surgeon right away if you see any of these  · Hug a pillow against the surgery area before you sneeze or cough  This will help prevent pain and protect the surgery area  What to eat after surgery:   · Eat low-fat foods for 4 to 6 weeks  while your body learns to digest fat without a gallbladder  Slowly increase the amount of fat that you eat  · Drink more liquids  Ask how much liquid to drink and which liquids are best for you  When to return to work and other activities:   · Rest often  and slowly increase your activity level each day  If an activity causes pain, wait several days before you do that activity again  · Do not drive  for the first 24 hours after surgery  Your surgeon will tell you when it is okay to drive after the first 24 hours  This is usually after you have stopped taking narcotic pain medicine for a few days  · Do not lift anything heavier than 10 pounds  for 4 to 6 weeks, or as directed  · You may return to work or other activities  as soon as your pain is controlled and you feel comfortable  This is usually 5 to 7 days after surgery      Follow up with your doctor or surgeon as directed:  Write down your questions so you remember to ask them during your visits  © Copyright [employer] 2021 Information is for End User's use only and may not be sold, redistributed or otherwise used for commercial purposes  All illustrations and images included in CareNotes® are the copyrighted property of [employer] M [employer]  or Rachel Tate  The above information is an  only  It is not intended as medical advice for individual conditions or treatments  Talk to your doctor, nurse or pharmacist before following any medical regimen to see if it is safe and effective for you

## 2021-12-28 NOTE — PERIOPERATIVE NURSING NOTE
Patient assisted with dressing, states she feels better after snack  Patient taken to car via wheelchair with   Discharged to home from [address] [place] [address]

## 2022-01-12 ENCOUNTER — OFFICE VISIT (OUTPATIENT)
Dept: SURGERY | Facility: CLINIC | Age: 47
End: 2022-01-12

## 2022-01-12 VITALS — HEIGHT: 66 IN | TEMPERATURE: 97.8 F | BODY MASS INDEX: 36.8 KG/M2 | WEIGHT: 229 LBS

## 2022-01-12 DIAGNOSIS — K80.20 CALCULUS OF GALLBLADDER WITHOUT CHOLECYSTITIS WITHOUT OBSTRUCTION: ICD-10-CM

## 2022-01-12 DIAGNOSIS — Z09 SURGICAL FOLLOW-UP CARE: Primary | ICD-10-CM

## 2022-01-12 PROCEDURE — 99024 POSTOP FOLLOW-UP VISIT: CPT | Performed by: SPECIALIST

## 2022-01-12 PROCEDURE — 3008F BODY MASS INDEX DOCD: CPT | Performed by: INTERNAL MEDICINE

## 2022-01-12 NOTE — PROGRESS NOTES
General Surgery Office Visit Follow up   [facility]  Patient: Emerita Lemus   : 1975 Sex: female MRN: [id number]   CSN: [id number] PCP: Rishi Rivas MD    Assessment/ Plan:  Emerita Lemus is a 42 y o  female  day(s) POD #   S/p 2 week s/p  cholecystectomy  Surgical follow-up care [Z09]    Plan  Stable postop   Removal of Steri-Strip  Discharge from follow-up    SUBJECTIVE:   I am doing very well my incision is well healed  OBJECTIVE:  No complaints  No fever no chills no rigors  Tolerating p o  Diet well  Normal bowel movement no constipation or diarrhea  Ambulating well   Vitals:   Temp 97 8 °F (36 6 °C) (Core)   Ht 5' 6" (1 676 m)   Wt 104 kg (229 lb)   LMP 2021   BMI 36 96 kg/m²     Active medications:    Current Outpatient Medications:     multivitamin (THERAGRAN) TABS, Take 1 tablet by mouth daily, Disp: , Rfl:     pantoprazole (PROTONIX) 40 mg tablet, Take 1 tablet (40 mg total) by mouth daily before breakfast, Disp: 90 tablet, Rfl: 3    Physical Exam:   General Alert awake   Normocephalic atraumatic PERRLA   Lungs clear bilaterally  Cardiac normal S1 normal S2  Abdomen soft,non tender Bowel sounds present  Skin: surgical dressing is C/D/I  Ext: No clubbing, cyanosis, edema  Surgical wound well healed    Visit Diagnosis:   Diagnoses and all orders for this visit:    Surgical follow-up care    Calculus of gallbladder without cholecystitis without obstruction       Plan of care was discussed with patient in detail    Pertinent labs reviewed  Most Recent Labs:   No visits with results within 2 Week(s) from this visit  Latest known visit with results is:   Admission on 2021, Discharged on 2021   Component Date Value Ref Range Status    Protocol Name 2021 [facility]   Final    Time In Exercise Phase 2021 00:09:00   Final    MAX   SYSTOLIC BP  031  mmHg Final    Max Diastolic Bp  60  mmHg Final    Max Heart Rate 2021 160  BPM Final    Max Predicted Heart Rate 12/23/2021 176  BPM Final    Reason for Termination 12/23/2021    Final                    Value:Fatigue  Leg discomfort      Test Indication 12/23/2021 Pre-Op Evaluation   Final    Target Hr Formular 12/23/2021 (220 - Age)*100%   Final    Chest Pain Statement 12/23/2021 none   Final    EXT Preg Test, Ur 12/28/2021 Negative  Negative Final    Control 12/28/2021 Valid  Valid Final    Case Report 12/28/2021    Final                    Value:Surgical Pathology Report                         Case: [id number]                                   Authorizing Provider:  Buzz Morejon MD          Collected:           12/28/2021 0944              Ordering Location:     [facility] Received:            12/28/2021 2011                                     Operating Room                                                               Pathologist:           Shabnam Paul MD                                                         Specimen:    Gallbladder                                                                                Final Diagnosis 12/28/2021    Final                    Value: This result contains rich text formatting which cannot be displayed here   Additional Information 12/28/2021    Final                    Value: This result contains rich text formatting which cannot be displayed here  Estefani Phillip Description 12/28/2021    Final                    Value: This result contains rich text formatting which cannot be displayed here  Pertinent images and available reads personally reviewed  Procedure: EGD    Result Date: 12/2/2021  Narrative:  [address] [facility] [id number] [phone] DATE OF SERVICE: 12/02/21 PHYSICIAN(S): Caroel Cormier MD - Attending Physician INDICATION: Epigastric pain, Calculus of gallbladder without cholecystitis without obstruction, Irregular bowel habits POST-OP DIAGNOSIS: See the impression below  PREPROCEDURE: Informed consent was obtained for the procedure, including sedation  Risks of perforation, hemorrhage, adverse drug reaction and aspiration were discussed  The patient was placed in the left lateral decubitus position  Patient was explained about the risks and benefits of the procedure  Risks including but not limited to bleeding, infection, and perforation were explained in detail  Also explained about less than 100% sensitivity with the exam and other alternatives  DETAILS OF PROCEDURE: Patient was taken to the procedure room where a time out was performed to confirm correct patient and correct procedure  The patient underwent monitored anesthesia care, which was administered by an anesthesia professional  The patient's blood pressure, heart rate, level of consciousness, respirations and oxygen were monitored throughout the procedure  The scope was advanced to the second part of the duodenum  Retroflexion was performed in the fundus  The patient experienced no blood loss  The procedure was not difficult  The patient tolerated the procedure well  There were no apparent complications  ANESTHESIA INFORMATION: ASA: II Anesthesia Type: IV Sedation with Anesthesia MEDICATIONS: No administrations occurring from 1230 to 1254 on 12/02/21 FINDINGS: The esophagus appeared normal  Stomach-mild erythema was noted  Biopsies done to check for H pylori  The duodenum appeared normal  SPECIMENS: ID Type Source Tests Collected by Time Destination 1 : GASTRIC BX Tissue Stomach TISSUE EXAM Marilee Lee MD 12/2/2021 12:42 PM  2 : DUODENAL BX Tissue Duodenum TISSUE EXAM Marilee Lee MD 12/2/2021 12:42 PM  3 : RANDOM COLON BX Tissue Colon TISSUE EXAM Marilee Lee MD 12/2/2021 12:49 PM      Impression: The esophagus appeared normal  Stomach-mild erythema was noted  Biopsies done to check for H pylori   The duodenum appeared normal  RECOMMENDATION: Await pathology results Continue protonelizabeth Fairbanks Jose Ortiz MD     Procedure: Colonoscopy    Addendum Date: 12/2/2021 Addendum:   [address] [id number] [phone] DATE OF SERVICE: 12/02/21 PHYSICIAN(S): Emi Pollock MD - Attending Physician INDICATION: Epigastric pain, Irregular bowel habits Colonoscopy performed for a diagnostic indication  POST-OP DIAGNOSIS: See the impression below  HISTORY: Prior colonoscopy: No prior colonoscopy  BOWEL PREPARATION: Sutab PREPROCEDURE: Informed consent was obtained for the procedure, including sedation  Risks including but not limited to bleeding, infection, perforation, adverse drug reaction and aspiration were explained in detail  Also explained about less than 100% sensitivity with the exam and other alternatives  The patient was placed in the left lateral decubitus position  DETAILS OF PROCEDURE: Patient was taken to the procedure room where a time out was performed to confirm correct patient and correct procedure  The patient underwent monitored anesthesia care, which was administered by an anesthesia professional  The patient's blood pressure, heart rate, level of consciousness, oxygen and respirations were monitored throughout the procedure  A digital rectal exam was performed  The scope was introduced through the anus and advanced to the terminal ileum  Retroflexion was performed in the rectum  The quality of bowel preparation was evaluated using the Pj Bowel Preparation Scale with scores of: right colon = 2, transverse colon = 2, left colon = 2  The total BBPS score was 6  Bowel prep was adequate  The patient experienced no blood loss  The procedure was not difficult  The patient tolerated the procedure well  There were no apparent complications   ANESTHESIA INFORMATION: ASA: II Anesthesia Type: IV Sedation with Anesthesia MEDICATIONS: No administrations occurring from 1230 to 1254 on 12/02/21 FINDINGS: Normal terminal ileum The cecum appeared normal  Colon- couple of diverticula in the ascending colon and few small diverticula in the sigmoid colon  Mucosa appeared normal   Random biopsies done to check for microscopic colitis EVENTS: Procedure Events Event Event Time ENDO CECUM REACHED 12/2/2021 12:46 PM ENDO SCOPE OUT TIME 12/2/2021 12:52 PM ENDO SCOPE OUT TIME 12/2/2021 12:54 PM SPECIMENS: ID Type Source Tests Collected by Time Destination 1 : GASTRIC BX Tissue Stomach TISSUE EXAM Isabelle Kirk MD 12/2/2021 12:42 PM  2 : DUODENAL BX Tissue Duodenum TISSUE EXAM Isabelle Kirk MD 12/2/2021 12:42 PM  3 : [address] Tissue Colon TISSUE EXAM Isabelle Kirk MD 12/2/2021 12:49 PM  EQUIPMENT: Colonoscope - IMPRESSION: Normal terminal ileum The cecum appeared normal  Colon- couple of diverticula in the ascending colon and few small diverticula in the sigmoid colon  Mucosa appeared normal   Random biopsies done to check for microscopic colitis RECOMMENDATION: Repeat screening colonoscopy in 10 years   Try Nini meier  For diarrhea Check celiac disease panel Isabelle Kirk MD     Result Date: 12/2/2021  Narrative: [address] [place] [phone] DATE OF SERVICE: 12/02/21 PHYSICIAN(S): Isabelle Kirk MD - Attending Physician INDICATION: Epigastric pain, Irregular bowel habits Colonoscopy performed for a diagnostic indication  POST-OP DIAGNOSIS: See the impression below  HISTORY: Prior colonoscopy: No prior colonoscopy  BOWEL PREPARATION: Sutab PREPROCEDURE: Informed consent was obtained for the procedure, including sedation  Risks including but not limited to bleeding, infection, perforation, adverse drug reaction and aspiration were explained in detail  Also explained about less than 100% sensitivity with the exam and other alternatives  The patient was placed in the left lateral decubitus position   DETAILS OF PROCEDURE: Patient was taken to the procedure room where a time out was performed to confirm correct patient and correct procedure  The patient underwent monitored anesthesia care, which was administered by an anesthesia professional  The patient's blood pressure, heart rate, level of consciousness, oxygen and respirations were monitored throughout the procedure  A digital rectal exam was performed  The scope was introduced through the anus and advanced to the terminal ileum  Retroflexion was performed in the rectum  The quality of bowel preparation was evaluated using the [facility] Bowel Preparation Scale with scores of: right colon = 2, transverse colon = 2, left colon = 2  The total BBPS score was 6  Bowel prep was adequate  The patient experienced no blood loss  The procedure was not difficult  The patient tolerated the procedure well  There were no apparent complications  ANESTHESIA INFORMATION: ASA: II Anesthesia Type: IV Sedation with Anesthesia MEDICATIONS: No administrations occurring from 1230 to 1254 on 12/02/21 FINDINGS: Normal terminal ileum The cecum appeared normal  Colon- couple of diverticula in the ascending colon and few small diverticula in the sigmoid colon  Mucosa appeared normal   Random biopsies done to check for microscopic colitis EVENTS: Procedure Events Event Event Time ENDO CECUM REACHED 12/2/2021 12:46 PM ENDO SCOPE OUT TIME 12/2/2021 12:52 PM ENDO SCOPE OUT TIME 12/2/2021 12:54 PM SPECIMENS: ID Type Source Tests Collected by Time Destination 1 : GASTRIC BX Tissue Stomach TISSUE EXAM Hernan Forbes MD 12/2/2021 12:42 PM  2 : DUODENAL BX Tissue Duodenum TISSUE EXAM Hernan Forbes MD 12/2/2021 12:42 PM  3 : [address] Tissue Colon TISSUE EXAM Hernan Forbes MD 12/2/2021 12:49 PM  EQUIPMENT: Colonoscope -     Impression: Normal terminal ileum The cecum appeared normal  Colon- couple of diverticula in the ascending colon and few small diverticula in the sigmoid colon    Mucosa appeared normal   Random biopsies done to check for microscopic colitis RECOMMENDATION: Repeat screening colonoscopy in 10 years   Try Nini sarah r n  For diarrhea Joe Pham MD     Procedure: Stress test only, exercise    Result Date: 12/23/2021  Narrative: Laurence Yangleonela  Stress ECG: The patient reached stage 4 0 of the protocol after exercising for 9 min and 0 sec and had a maximal HR of 160 bpm (90 % of MPHR) and 10 1 METS  The patient experienced no angina during the test  The test was stopped because the patient experienced fatigue and dyspnea  The patient achieved the target heart rate    Stress ECG: No ST deviation is noted  The ECG was negative for ischemia  No angina, 10 1 Mets  No ecg changes during exercise  Late recovery ST depression seen but no chest pain  Procedure: Stress strip    Result Date: 12/28/2021  Narrative: Confirmed by Delaney Ramirez ([id number]),  Kalli Parker ([id number]) on 12/28/2021 7:59:39 AM    Pertinent notes reviewed  Final Diagnosis  A  Gallbladder, cholecystectomy:  - Chronic cholecystitis with cholelithiasis, cholesterolosis, and Rokitansky-Aschoff sinuses  Counseling / Coordination of Care  Total Office time /unit time spent today 15minutes  Greater than 50% of total time was spent with the patient and / or family counseling and / or coordination of care  A description of the counseling / coordination of care:  I performed an interim history, pertinent images and labs, performed a physical examination to arrive at the plan delineated above with associated thought processes  Verner Conch, MD MS FRCS FACS  [facility]  01/12/22 10:05 AM        Portions of the record may have been created with voice recognition software  Occasional wrong word or "sound a like" substitutions may have occurred due to the inherent limitations of voice recognition software  Read the chart carefully and recognize, using context, where substitutions have occurred

## 2022-03-28 ENCOUNTER — OFFICE VISIT (OUTPATIENT)
Dept: FAMILY MEDICINE CLINIC | Facility: CLINIC | Age: 47
End: 2022-03-28
Payer: COMMERCIAL

## 2022-03-28 VITALS
HEART RATE: 78 BPM | SYSTOLIC BLOOD PRESSURE: 120 MMHG | WEIGHT: 237 LBS | TEMPERATURE: 97.8 F | DIASTOLIC BLOOD PRESSURE: 80 MMHG | HEIGHT: 66 IN | RESPIRATION RATE: 14 BRPM | BODY MASS INDEX: 38.09 KG/M2

## 2022-03-28 DIAGNOSIS — M72.2 PLANTAR FASCIITIS: ICD-10-CM

## 2022-03-28 DIAGNOSIS — M79.644 PAIN IN FINGER OF RIGHT HAND: Primary | ICD-10-CM

## 2022-03-28 DIAGNOSIS — K80.20 CALCULUS OF GALLBLADDER WITHOUT CHOLECYSTITIS WITHOUT OBSTRUCTION: ICD-10-CM

## 2022-03-28 PROCEDURE — 1036F TOBACCO NON-USER: CPT | Performed by: FAMILY MEDICINE

## 2022-03-28 PROCEDURE — 3008F BODY MASS INDEX DOCD: CPT | Performed by: FAMILY MEDICINE

## 2022-03-28 PROCEDURE — 99213 OFFICE O/P EST LOW 20 MIN: CPT | Performed by: FAMILY MEDICINE

## 2022-03-28 NOTE — PROGRESS NOTES
Britt Chapin 1975 female MRN: [id number]    FAMILY PRACTICE OFFICE VISIT  [facility] - [address]      ASSESSMENT/PLAN  Britt Chapin is a 41 y o  female presents to the office for    Diagnoses and all orders for this visit:    Pain in finger of right hand  -     XR hand 3+ vw right; Future    Calculus of gallbladder without cholecystitis without obstruction    Pain in the right D IP joint of her ring finger; likely DJD; will obtain an x-ray to confirm  Patient declines seeing Hand Surgeon at this time    Status post gallbladder removal incision healing well with no signs of hernia  Patient showing no swelling on lower extremities  Recommend following up in 6 months for her annual physical exam  Plantar fasciitis:  Recommend exercises to be performed  Pending cardiology eval             Future Appointments   Date Time Provider Coco Monsalve   4/8/2022  2:20 PM MD JAYANT Mcgee Prema Landapiter   9/28/2022  8:45 AM Rolan Retana MD [facility]          SUBJECTIVE  CC: Follow-up (6mo f/u)      HPI:  Britt Chapin is a 41 y o  female who presents for a follow-up after being evaluated for gallbladder removal   Patient states she has been doing well she has been trying to change her habits and see what she can and cannot be given the diarrhea that she experiences  Patient denies any swelling  At times patient does get stiff thing of her lower extremity feet  She does have pain in her right 4th finger and wanted to be evaluated about it  Has an upcoming appoint with Cardiology      Review of Systems   Constitutional: Negative for activity change, appetite change, chills, fatigue and fever  HENT: Negative for congestion  Respiratory: Negative for cough, chest tightness and shortness of breath  Cardiovascular: Negative for chest pain and leg swelling  Gastrointestinal: Positive for diarrhea   Negative for abdominal distention, abdominal pain, constipation, nausea and vomiting  Musculoskeletal: Positive for arthralgias  All other systems reviewed and are negative  Historical Information   The patient history was reviewed as follows:  Past Medical History:   Diagnosis Date    Diarrhea     Gall stones     GERD (gastroesophageal reflux disease)     Irregular heart beat     had some issues 2017 and had a cardiac workup done including 2 stress tests which were negative,         Medications:     Current Outpatient Medications:     multivitamin (THERAGRAN) TABS, Take 1 tablet by mouth daily, Disp: , Rfl:     Allergies   Allergen Reactions    Sulfa Antibiotics Hives       OBJECTIVE  Vitals:   Vitals:    03/28/22 0847   BP: 120/80   BP Location: Left arm   Patient Position: Sitting   Cuff Size: Adult   Pulse: 78   Resp: 14   Temp: 97 8 °F (36 6 °C)   TempSrc: Temporal   Weight: 108 kg (237 lb)   Height: 5' 6" (1 676 m)         Physical Exam  Vitals reviewed  Constitutional:       Appearance: She is well-developed  HENT:      Head: Normocephalic and atraumatic  Eyes:      Conjunctiva/sclera: Conjunctivae normal       Pupils: Pupils are equal, round, and reactive to light  Cardiovascular:      Rate and Rhythm: Normal rate and regular rhythm  Heart sounds: Normal heart sounds  Pulmonary:      Effort: Pulmonary effort is normal  No respiratory distress  Breath sounds: Normal breath sounds  Abdominal:      General: Abdomen is flat  Bowel sounds are normal  There is no distension  Tenderness: There is no abdominal tenderness  Hernia: No hernia is present  Musculoskeletal:         General: Normal range of motion  Cervical back: Normal range of motion and neck supple  Skin:     General: Skin is warm  Capillary Refill: Capillary refill takes less than 2 seconds  Comments: Right hand dip swelling over 4th finger   Neurological:      Mental Status: She is alert and oriented to person, place, and time  Maricruz Campuzano MD,   [facility]  3/28/2022

## 2022-03-28 NOTE — PATIENT INSTRUCTIONS
Plantar Fasciitis Exercises   WHAT YOU NEED TO KNOW:   Plantar fasciitis exercises help stretch your plantar fascia, calf muscles, and Achilles tendon  They also help strengthen the muscles that support your heel and foot  Exercises and stretching can help prevent plantar fasciitis from getting worse or coming back  DISCHARGE INSTRUCTIONS:   Contact your healthcare provider if:   · Your pain and swelling increase  · You develop new knee, hip, or back pain  · You have questions or concerns about your condition or care  How to do plantar fasciitis exercises:  Ask your healthcare provider when to start these exercises and how often to do them  · Slant board stretch:  Stand on a slanted board with your toes higher than your heel  Press your heel into the board  Keep your knee slightly bent  Hold this position for 1 minute  Repeat 5 times  · Heel stretch:  Stand up straight with your hands on a wall  Place your injured leg slightly behind your other leg  Keep your heels flat on the floor, lean forward, and bend both knees  Hold for 30 seconds  · Calf stretch:  Stand up straight with your hands on a wall  Step forward so that your uninjured foot is in front of your injured foot  Keep your front leg bent and your back leg straight  Gently lean forward until you feel your calf stretch  Hold for 30 seconds and then relax  · Seated plantar fascia stretch:  Sit on a firm surface, such as the floor or a mat  Extend your legs out in front of you  Raise your injured foot a few inches off the ground  Keep your leg straight  Grab the toes of your injured foot and pull them toward you  With your other hand, feel your plantar fascia  You should feel it press outward  Hold for 30 seconds  If you cannot reach your toes, loop a towel or tie around your foot  Gently pull on the towel or tie and flex your toes toward you  · Heel raises:  Stand on the injured leg   Raise your other leg off the ground  Hold onto a railing or wall for balance  Slowly rise up on the toes of your injured leg  Hold for 5 seconds  Slowly lower your heel to the ground  · Toe curls:  Place a towel on the floor  Put your foot flat on the towel  Grab the towel with your toes by curling them around the towel  Lift the towel up with your toes  · Toe taps:  Sit down and place your foot flat on the floor  Keep your heel on the floor  Point all your toes up toward the ceiling  While the 4 smaller toes are pointed up, bend your big toe down and tap it on the ground  Do 10 to 50 taps  Point all 5 toes up toward the ceiling again  This time keep your big toe pointed up and tap the 4 smaller toes on the ground  Do 10 to 50 taps each time  Follow up with your doctor as directed:  Write down your questions so you remember to ask them during your visits  © Copyright [employer] 2022 Information is for End User's use only and may not be sold, redistributed or otherwise used for commercial purposes  All illustrations and images included in CareNotes® are the copyrighted property of [employer]  or [facility] Gary Perez   The above information is an  only  It is not intended as medical advice for individual conditions or treatments  Talk to your doctor, nurse or pharmacist before following any medical regimen to see if it is safe and effective for you

## 2022-03-31 ENCOUNTER — APPOINTMENT (OUTPATIENT)
Dept: RADIOLOGY | Facility: CLINIC | Age: 47
End: 2022-03-31
Payer: COMMERCIAL

## 2022-03-31 DIAGNOSIS — M79.644 PAIN IN FINGER OF RIGHT HAND: ICD-10-CM

## 2022-03-31 PROCEDURE — 73130 X-RAY EXAM OF HAND: CPT

## 2022-04-04 ENCOUNTER — TELEPHONE (OUTPATIENT)
Dept: FAMILY MEDICINE CLINIC | Facility: CLINIC | Age: 47
End: 2022-04-04

## 2022-04-04 NOTE — TELEPHONE ENCOUNTER
Pt advised and she doesn't want to see a hand specialist at this time tc/yanet----- Message from Jean Marie Hale MD sent at 4/3/2022 10:35 PM EDT -----  Please advise patient that there is some osteoarthritis of her joint space that would be causing her swelling   Would she like to see the hand specialist?

## 2022-04-04 NOTE — TELEPHONE ENCOUNTER
----- Message from Hema Chávez MD sent at 4/3/2022 10:35 PM EDT -----  Please advise patient that there is some osteoarthritis of her joint space that would be causing her swelling   Would she like to see the hand specialist?

## 2022-04-08 ENCOUNTER — OFFICE VISIT (OUTPATIENT)
Dept: CARDIOLOGY CLINIC | Facility: CLINIC | Age: 47
End: 2022-04-08
Payer: COMMERCIAL

## 2022-04-08 VITALS
WEIGHT: 236 LBS | HEIGHT: 66 IN | HEART RATE: 96 BPM | OXYGEN SATURATION: 95 % | SYSTOLIC BLOOD PRESSURE: 130 MMHG | BODY MASS INDEX: 37.93 KG/M2 | DIASTOLIC BLOOD PRESSURE: 74 MMHG | TEMPERATURE: 98.6 F

## 2022-04-08 DIAGNOSIS — Z90.49 HISTORY OF CHOLECYSTECTOMY: ICD-10-CM

## 2022-04-08 DIAGNOSIS — R00.2 PALPITATIONS: ICD-10-CM

## 2022-04-08 DIAGNOSIS — E66.9 OBESITY (BMI 30-39.9): ICD-10-CM

## 2022-04-08 DIAGNOSIS — R07.89 CHEST DISCOMFORT: ICD-10-CM

## 2022-04-08 PROCEDURE — 3008F BODY MASS INDEX DOCD: CPT | Performed by: INTERNAL MEDICINE

## 2022-04-08 PROCEDURE — 1036F TOBACCO NON-USER: CPT | Performed by: INTERNAL MEDICINE

## 2022-04-08 PROCEDURE — 99214 OFFICE O/P EST MOD 30 MIN: CPT | Performed by: INTERNAL MEDICINE

## 2022-04-08 NOTE — PROGRESS NOTES
progress note- Cardiology Office  [facility]  Mary Pearl 41 y o  female MRN: [id number]  : 1975  Unit/Bed#:  Encounter: [id number]      Assessment:     1  Palpitations    2  History of cholecystectomy    3  Obesity (BMI 30-39 9)    4  Chest discomfort        Discussion summary and Plan:    1  Epigastric discomfort/chest discomfort  No more episodes of epigastric discomfort or chest discomfort she had gallbladder surgery  2   Palpitations she had history of atrial palpitations in the past   Workup was 3-4 years ago will check Holter monitor to rule out any occult atrial arrhythmias  3    History of anxiety  Patient was reassured Lifestyle modification recommended on advised to cut back on caffeine  4    Obesity with BMI around 38  Encouraged to lose weight she is trying to do exercise she does exercise this morning         Patient  was advised and educated to call our office  immediately if  patient has any new symptoms of chest pain/shortness of breath, near-syncope, syncope, light headedness sustained palpitations  or any other cardiovascular symptoms before their scheduled follow-up appointment  Office #[phone]  Thank you for your consultation  If you have any question please call me at [phone]    Counseling :  A description of the counseling  Goals and Barriers  Patient's ability to self care: Yes  Medication side effect reviewed with patient in detail and all their questions answered to their satisfaction  Primary Care Physician : Will Jeter MD    HPI :     Mary Pearl is a 41y o  year old female who was referred   To us for preoperative clearance  Patient who has medical history significant for gallstone on ultrasound, questionable history of atrial flutter as per surgical note, unexplained diarrhea, obesity with BMI around 37, recurrent history of epigastric discomfort/chest pain was sent to us for preoperative clearance    She has ultrasound done of her abdomen which shows cholelithiasis  She had blood work done in September 2021 electrolytes were acceptable  TSH was acceptable  Her pregnancy test was negative in December 21  Lately she has noted some epigastric discomfort which was more in her right upper quadrant area and workup has shown gallstone  She had history of palpitation and flutter where she has extensive cardiac workup done 3 years ago which was in the form of echo Doppler as well as a nuclear stress test and monitoring and workup was found to be negative  Workup was done in [facility] a trying to get those records  She denies any chest pain any shortness of breath no other issues  Has epigastric discomfort as mentioned above  She does not smoke  There is no family history of premature coronary artery disease both her parents and siblings are okay  She is  she has 3 kids and she is very active with them  04/08/2022  Above reviewed  Patient came for follow-up  She was initially seen for preoperative clearance at that time she had gallbladder bladder issues she was having chest discomfort and her exercise stress was normal   She is doing well otherwise some palpitations  She does get easily anxious  She had episodes of palpitation which are only for short duration she never felt she is going to pass out or dizziness or lightheadedness  No nausea no vomiting no fever no chills  She has cardiac workup done [place] few years ago echo was told normal LV systolic function and stress test has been okay  No other issues at this time  Review of Systems   Constitutional: Negative for activity change, chills, diaphoresis, fever and unexpected weight change  HENT: Negative for congestion  Eyes: Negative for discharge and redness  Respiratory: Negative for cough, chest tightness, shortness of breath and wheezing  Cardiovascular: Positive for palpitations   Negative for chest pain and leg swelling  Gastrointestinal: Negative for abdominal pain, diarrhea and nausea  Endocrine: Negative  Genitourinary: Negative for decreased urine volume and urgency  Musculoskeletal: Negative  Negative for arthralgias, back pain and gait problem  Skin: Negative for rash and wound  Allergic/Immunologic: Negative  Neurological: Negative for dizziness, seizures, syncope, weakness, light-headedness and headaches  Hematological: Negative  Psychiatric/Behavioral: Negative for agitation and confusion  The patient is nervous/anxious          Historical Information   Past Medical History:   Diagnosis Date    Diarrhea     Gall stones     GERD (gastroesophageal reflux disease)     Irregular heart beat     had some issues 2017 and had a cardiac workup done including 2 stress tests which were negative,     Past Surgical History:   Procedure Laterality Date    BREAST SURGERY Right     COLONOSCOPY      FRACTURE SURGERY Left     pins in left elbow-removed    [place] LAP,CHOLECYSTECTOMY N/A 12/28/2021    Procedure: CHOLECYSTECTOMY LAPAROSCOPIC;  Surgeon: Dne Lenz MD;  Location: [facility];  Service: General     Social History     Substance and Sexual Activity   Alcohol Use Not Currently    Comment: rarely     Social History     Substance and Sexual Activity   Drug Use Never     Social History     Tobacco Use   Smoking Status Never Smoker   Smokeless Tobacco Never Used     Family History:   Family History   Problem Relation Age of Onset    Substance Abuse Maternal Aunt     Alcohol abuse Maternal Aunt     Pancreatic cancer Maternal Uncle     KAT disease Mother        Meds/Allergies     Allergies   Allergen Reactions    Sulfa Antibiotics Hives       Current Outpatient Medications:     multivitamin (THERAGRAN) TABS, Take 1 tablet by mouth daily, Disp: , Rfl:     Vitals: Blood pressure 130/74, pulse 96, temperature 98 6 °F (37 °C), height 5' 6" (1 676 m), weight 107 kg (236 lb), SpO2 95 %     Body mass index is 38 09 kg/m²  Wt Readings from Last 3 Encounters:   04/08/22 107 kg (236 lb)   03/28/22 108 kg (237 lb)   01/12/22 104 kg (229 lb)     Vitals:    04/08/22 1424   Weight: 107 kg (236 lb)     BP Readings from Last 3 Encounters:   04/08/22 130/74   03/28/22 120/80   12/28/21 125/67         Physical Exam  Constitutional:       General: She is not in acute distress  Appearance: She is well-developed  She is not diaphoretic  Neck:      Thyroid: No thyromegaly  Vascular: No JVD  Cardiovascular:      Rate and Rhythm: Normal rate and regular rhythm  Pulses: Normal pulses  Heart sounds: Normal heart sounds  Pulmonary:      Effort: Pulmonary effort is normal  No respiratory distress  Breath sounds: Normal breath sounds  No wheezing or rales  Abdominal:      General: There is no distension  Palpations: Abdomen is soft  Tenderness: There is no abdominal tenderness  There is no rebound  Musculoskeletal:         General: No tenderness  Normal range of motion  Cervical back: Normal range of motion and neck supple  Skin:     General: Skin is warm and dry  Neurological:      Mental Status: She is alert and oriented to person, place, and time  Psychiatric:         Behavior: Behavior normal          Judgment: Judgment normal            Diagnostic Studies Review Cardio:     exercise stress test done December 2021 patient exercise into   Stage III  exercise 9 minute and achieved workload of 93 7 metabolic equivalents  She has no symptoms no EKG changes  Patient has extensive cardiac workup done in the form of stress test which was nuclear and echo in [facility] area with cardiologist we are trying to get record it was done about 3 and half years ago  EKG:  Twelve lead EKG done 12/15/2021 shows normal sinus rhythm heart rate 61 beats per minute    QS in V1 to V3 most likely lead location no other significant abnormality        Lab Review   Lab Results Component Value Date    WBC 4 70 12/21/2021    HGB 12 4 12/21/2021    HCT 38 1 12/21/2021    MCV 98 12/21/2021    RDW 12 9 12/21/2021     12/21/2021     BMP:  Lab Results   Component Value Date    SODIUM 141 12/21/2021    K 3 8 12/21/2021     12/21/2021    CO2 26 12/21/2021    BUN 10 12/21/2021    CREATININE 1 03 12/21/2021    GLUC 89 09/22/2021    GLUF 95 12/21/2021    CALCIUM 8 3 12/21/2021    EGFR 66 12/21/2021     LFT:  Lab Results   Component Value Date    AST 18 12/21/2021    ALT 38 12/21/2021    ALKPHOS 87 12/21/2021    TP 7 3 12/21/2021    ALB 3 7 12/21/2021            Dr William Ridley MD [facility]      "This note has been constructed using a voice recognition system  Therefore there may be syntax, spelling, and/or grammatical errors   Please call if you have any questions  "

## 2022-04-18 ENCOUNTER — HOSPITAL ENCOUNTER (OUTPATIENT)
Dept: NON INVASIVE DIAGNOSTICS | Facility: HOSPITAL | Age: 47
Discharge: HOME | End: 2022-04-18
Attending: INTERNAL MEDICINE
Payer: COMMERCIAL

## 2022-04-18 DIAGNOSIS — Z90.49 HISTORY OF CHOLECYSTECTOMY: ICD-10-CM

## 2022-04-18 DIAGNOSIS — R00.2 PALPITATIONS: ICD-10-CM

## 2022-04-18 DIAGNOSIS — R07.89 CHEST DISCOMFORT: ICD-10-CM

## 2022-04-18 PROCEDURE — 93226 XTRNL ECG REC<48 HR SCAN A/R: CPT

## 2022-04-18 PROCEDURE — 93225 XTRNL ECG REC<48 HRS REC: CPT

## 2022-04-26 PROCEDURE — 93227 XTRNL ECG REC<48 HR R&I: CPT | Performed by: INTERNAL MEDICINE

## 2022-04-27 ENCOUNTER — TELEPHONE (OUTPATIENT)
Dept: CARDIOLOGY CLINIC | Facility: CLINIC | Age: 47
End: 2022-04-27

## 2022-04-27 NOTE — TELEPHONE ENCOUNTER
----- Message from Aidan Das MD sent at 4/27/2022  8:07 AM EDT -----  Pt's Holter shows no significant tachy or Fortunato arrhthymias, few PACs and PVCs were noted    Pt can keep his appointment  Please call patient

## 2022-09-26 ENCOUNTER — OFFICE VISIT (OUTPATIENT)
Dept: FAMILY MEDICINE CLINIC | Facility: CLINIC | Age: 47
End: 2022-09-26
Payer: COMMERCIAL

## 2022-09-26 ENCOUNTER — LAB (OUTPATIENT)
Dept: LAB | Facility: HOSPITAL | Age: 47
End: 2022-09-26
Payer: COMMERCIAL

## 2022-09-26 VITALS
WEIGHT: 237.5 LBS | SYSTOLIC BLOOD PRESSURE: 110 MMHG | HEART RATE: 76 BPM | RESPIRATION RATE: 14 BRPM | TEMPERATURE: 97.8 F | DIASTOLIC BLOOD PRESSURE: 70 MMHG | BODY MASS INDEX: 38.17 KG/M2 | HEIGHT: 66 IN | OXYGEN SATURATION: 98 %

## 2022-09-26 DIAGNOSIS — K59.1 FUNCTIONAL DIARRHEA: ICD-10-CM

## 2022-09-26 DIAGNOSIS — Z13.220 SCREENING CHOLESTEROL LEVEL: ICD-10-CM

## 2022-09-26 DIAGNOSIS — R00.2 PALPITATIONS: ICD-10-CM

## 2022-09-26 DIAGNOSIS — R00.2 PALPITATIONS: Primary | ICD-10-CM

## 2022-09-26 LAB
ALBUMIN SERPL BCP-MCNC: 3.8 G/DL (ref 3.5–5)
ALP SERPL-CCNC: 95 U/L (ref 46–116)
ALT SERPL W P-5'-P-CCNC: 35 U/L (ref 12–78)
ANION GAP SERPL CALCULATED.3IONS-SCNC: 8 MMOL/L (ref 4–13)
AST SERPL W P-5'-P-CCNC: 17 U/L (ref 5–45)
BASOPHILS # BLD AUTO: 0.04 THOUSANDS/ΜL (ref 0–0.1)
BASOPHILS NFR BLD AUTO: 1 % (ref 0–1)
BILIRUB SERPL-MCNC: 0.48 MG/DL (ref 0.2–1)
BUN SERPL-MCNC: 10 MG/DL (ref 5–25)
CALCIUM SERPL-MCNC: 8.4 MG/DL (ref 8.3–10.1)
CHLORIDE SERPL-SCNC: 105 MMOL/L (ref 96–108)
CHOLEST SERPL-MCNC: 211 MG/DL
CO2 SERPL-SCNC: 27 MMOL/L (ref 21–32)
CREAT SERPL-MCNC: 0.95 MG/DL (ref 0.6–1.3)
EOSINOPHIL # BLD AUTO: 0.3 THOUSAND/ΜL (ref 0–0.61)
EOSINOPHIL NFR BLD AUTO: 5 % (ref 0–6)
ERYTHROCYTE [DISTWIDTH] IN BLOOD BY AUTOMATED COUNT: 13.2 % (ref 11.6–15.1)
GFR SERPL CREATININE-BSD FRML MDRD: 72 ML/MIN/1.73SQ M
GLUCOSE P FAST SERPL-MCNC: 86 MG/DL (ref 65–99)
HCT VFR BLD AUTO: 39.6 % (ref 34.8–46.1)
HDLC SERPL-MCNC: 35 MG/DL
HGB BLD-MCNC: 12.8 G/DL (ref 11.5–15.4)
IMM GRANULOCYTES # BLD AUTO: 0.02 THOUSAND/UL (ref 0–0.2)
IMM GRANULOCYTES NFR BLD AUTO: 0 % (ref 0–2)
LDLC SERPL CALC-MCNC: 156 MG/DL (ref 0–100)
LYMPHOCYTES # BLD AUTO: 2.21 THOUSANDS/ΜL (ref 0.6–4.47)
LYMPHOCYTES NFR BLD AUTO: 37 % (ref 14–44)
MCH RBC QN AUTO: 31.6 PG (ref 26.8–34.3)
MCHC RBC AUTO-ENTMCNC: 32.3 G/DL (ref 31.4–37.4)
MCV RBC AUTO: 98 FL (ref 82–98)
MONOCYTES # BLD AUTO: 0.34 THOUSAND/ΜL (ref 0.17–1.22)
MONOCYTES NFR BLD AUTO: 6 % (ref 4–12)
NEUTROPHILS # BLD AUTO: 3.03 THOUSANDS/ΜL (ref 1.85–7.62)
NEUTS SEG NFR BLD AUTO: 51 % (ref 43–75)
NONHDLC SERPL-MCNC: 176 MG/DL
NRBC BLD AUTO-RTO: 0 /100 WBCS
PLATELET # BLD AUTO: 314 THOUSANDS/UL (ref 149–390)
PMV BLD AUTO: 8.5 FL (ref 8.9–12.7)
POTASSIUM SERPL-SCNC: 3.8 MMOL/L (ref 3.5–5.3)
PROT SERPL-MCNC: 7.6 G/DL (ref 6.4–8.4)
RBC # BLD AUTO: 4.05 MILLION/UL (ref 3.81–5.12)
SODIUM SERPL-SCNC: 140 MMOL/L (ref 135–147)
TRIGL SERPL-MCNC: 102 MG/DL
TSH SERPL DL<=0.05 MIU/L-ACNC: 2.76 UIU/ML (ref 0.45–4.5)
WBC # BLD AUTO: 5.94 THOUSAND/UL (ref 4.31–10.16)

## 2022-09-26 PROCEDURE — 84443 ASSAY THYROID STIM HORMONE: CPT

## 2022-09-26 PROCEDURE — 99214 OFFICE O/P EST MOD 30 MIN: CPT | Performed by: FAMILY MEDICINE

## 2022-09-26 PROCEDURE — 80061 LIPID PANEL: CPT

## 2022-09-26 PROCEDURE — 85025 COMPLETE CBC W/AUTO DIFF WBC: CPT

## 2022-09-26 PROCEDURE — 36415 COLL VENOUS BLD VENIPUNCTURE: CPT

## 2022-09-26 PROCEDURE — 80053 COMPREHEN METABOLIC PANEL: CPT

## 2022-09-26 NOTE — PROGRESS NOTES
Marlyn Monson 1975 female MRN: [id number]    [address] [place] [address]      ASSESSMENT/PLAN  Marlyn Monson is a 41 y o  female presents to the office for    Diagnoses and all orders for this visit:    Palpitations  -     TSH, 3rd generation with Free T4 reflex; Future  -     Comprehensive metabolic panel; Future  -     CBC and differential; Future    Functional diarrhea    Screening cholesterol level  -     Lipid panel; Future     Reviewed Holter monitor today  Continue following Cardiology  All screening lab sent above         Future Appointments   Date Time Provider Coco Monsavle   10/20/2022 11:40 AM Naila Richard MD [facility]          SUBJECTIVE  CC: Follow-up (Gallbladder surgery )      HPI:  Marlyn Monson is a 41 y o  female who presents for a follow-up on chronic conditions  Patient just recently had a workup done with her cardiologist   She continues to have palpitations every now and then  Her functional diarrhea seems to be stable  Denies any acute concerns today  Review of Systems   Constitutional: Negative for activity change, appetite change, chills, fatigue and fever  HENT: Negative for congestion  Respiratory: Negative for cough, chest tightness and shortness of breath  Cardiovascular: Positive for palpitations  Negative for chest pain and leg swelling  Gastrointestinal: Positive for diarrhea  Negative for abdominal distention, abdominal pain, constipation, nausea and vomiting  All other systems reviewed and are negative        Historical Information   The patient history was reviewed as follows:  Past Medical History:   Diagnosis Date    Diarrhea     Gall stones     GERD (gastroesophageal reflux disease)     Irregular heart beat     had some issues 2017 and had a cardiac workup done including 2 stress tests which were negative,         Medications:     Current Outpatient Medications:    multivitamin (THERAGRAN) TABS, Take 1 tablet by mouth daily, Disp: , Rfl:     Allergies   Allergen Reactions    Sulfa Antibiotics Hives       OBJECTIVE  Vitals:   Vitals:    09/26/22 0948   BP: 110/70   BP Location: Left arm   Patient Position: Sitting   Cuff Size: Large   Pulse: 76   Resp: 14   Temp: 97 8 °F (36 6 °C)   SpO2: 98%   Weight: 108 kg (237 lb 8 oz)   Height: 5' 6" (1 676 m)         Physical Exam  Vitals reviewed  Constitutional:       Appearance: She is well-developed  HENT:      Head: Normocephalic and atraumatic  Eyes:      Conjunctiva/sclera: Conjunctivae normal       Pupils: Pupils are equal, round, and reactive to light  Cardiovascular:      Rate and Rhythm: Normal rate and regular rhythm  Heart sounds: Normal heart sounds  Pulmonary:      Effort: Pulmonary effort is normal  No respiratory distress  Breath sounds: Normal breath sounds  Musculoskeletal:         General: Normal range of motion  Cervical back: Normal range of motion and neck supple  Skin:     General: Skin is warm  Capillary Refill: Capillary refill takes less than 2 seconds  Neurological:      Mental Status: She is alert and oriented to person, place, and time                      Makayla ROY   [facility]  10/1/2022

## 2022-10-20 ENCOUNTER — OFFICE VISIT (OUTPATIENT)
Dept: CARDIOLOGY CLINIC | Facility: CLINIC | Age: 47
End: 2022-10-20
Payer: COMMERCIAL

## 2022-10-20 VITALS
SYSTOLIC BLOOD PRESSURE: 110 MMHG | WEIGHT: 238 LBS | DIASTOLIC BLOOD PRESSURE: 80 MMHG | BODY MASS INDEX: 38.25 KG/M2 | TEMPERATURE: 97 F | HEIGHT: 66 IN | OXYGEN SATURATION: 99 % | HEART RATE: 71 BPM

## 2022-10-20 DIAGNOSIS — E66.9 OBESITY (BMI 30-39.9): ICD-10-CM

## 2022-10-20 DIAGNOSIS — R00.2 PALPITATIONS: ICD-10-CM

## 2022-10-20 DIAGNOSIS — E78.5 DYSLIPIDEMIA: ICD-10-CM

## 2022-10-20 DIAGNOSIS — Z86.79 HISTORY OF ATRIAL FLUTTER: ICD-10-CM

## 2022-10-20 PROCEDURE — 99214 OFFICE O/P EST MOD 30 MIN: CPT | Performed by: INTERNAL MEDICINE

## 2022-10-20 PROCEDURE — 93000 ELECTROCARDIOGRAM COMPLETE: CPT | Performed by: INTERNAL MEDICINE

## 2022-10-20 NOTE — PROGRESS NOTES
progress note- Cardiology Office   [facility]  Littie Code 41 y o  female MRN: [id number]  : 1975  Unit/Bed#:  Encounter: [id number]      Assessment:     1  History of atrial flutter    2  Palpitations    3  Obesity (BMI 30-39 9)    4  Dyslipidemia        Discussion summary and Plan:    1  Dyslipidemia with low HDL and high LDL  Encouraged her to start exercise program   Her risk for cardiovascular event at this time is low around 1 5% she is encouraged to change her dietary changes  Otherwise follow-up in 1 year  2   Palpitations she had history of atrial palpitations in the past   Holter monitor shows no evidence of tachy-liana arrhythmia exercise stress was okay echo done a year ago at [facility] acceptable  3    History of anxiety  Patient was reassured Lifestyle modification recommended on advised to cut back on caffeine  4    Obesity with BMI around 38  Encouraged to lose weight she is trying to do exercise she does exercise this morning       Follow-up 1 year      Patient  was advised and educated to call our office  immediately if  patient has any new symptoms of chest pain/shortness of breath, near-syncope, syncope, light headedness sustained palpitations  or any other cardiovascular symptoms before their scheduled follow-up appointment  Office #[phone]  Thank you for your consultation  If you have any question please call me at [phone]    Counseling :  A description of the counseling  Goals and Barriers  Patient's ability to self care: Yes  Medication side effect reviewed with patient in detail and all their questions answered to their satisfaction  Primary Care Physician : Buckley Schlatter, MD    HPI :     Littie Code is a 41y o  year old female who was referred   To us for preoperative clearance    Patient who has medical history significant for gallstone on ultrasound, questionable history of atrial flutter as per surgical note, unexplained diarrhea, obesity with BMI around 37, recurrent history of epigastric discomfort/chest pain was sent to us for preoperative clearance  She has ultrasound done of her abdomen which shows cholelithiasis  She had blood work done in September 2021 electrolytes were acceptable  TSH was acceptable  Her pregnancy test was negative in December 21  Lately she has noted some epigastric discomfort which was more in her right upper quadrant area and workup has shown gallstone  She had history of palpitation and flutter where she has extensive cardiac workup done 3 years ago which was in the form of echo Doppler as well as a nuclear stress test and monitoring and workup was found to be negative  Workup was done in [facility] a trying to get those records  She denies any chest pain any shortness of breath no other issues  Has epigastric discomfort as mentioned above  She does not smoke  There is no family history of premature coronary artery disease both her parents and siblings are okay  She is  she has 3 kids and she is very active with them  04/08/2022  Above reviewed  Patient came for follow-up  She was initially seen for preoperative clearance at that time she had gallbladder bladder issues she was having chest discomfort and her exercise stress was normal   She is doing well otherwise some palpitations  She does get easily anxious  She had episodes of palpitation which are only for short duration she never felt she is going to pass out or dizziness or lightheadedness  No nausea no vomiting no fever no chills  She has cardiac workup done [place] few years ago echo was told normal LV systolic function and stress test has been okay  No other issues at this time  10/20/2022  Above reviewed  Patient came for follow-up she is doing well  She had no more episodes of chest discomfort a still occasionally get palpitations    Holter monitor shows no evidence of significant tachy-liana arrhythmias  And her stress test was normal   She denies any new complaints EKG shows sinus rhythm heart rate 71 beats per minute no nausea no vomiting no fever no chills no PND no orthopnea no other cardiac issues  She has echo done few years ago which was normal LV systolic function and no other cardiovascular issues  Review of Systems   Constitutional: Negative for activity change, chills, diaphoresis, fever and unexpected weight change  HENT: Negative for congestion  Eyes: Negative for discharge and redness  Respiratory: Negative for cough, chest tightness, shortness of breath and wheezing  Cardiovascular: Negative  Negative for chest pain, palpitations and leg swelling  Gastrointestinal: Negative for abdominal pain, diarrhea and nausea  Endocrine: Negative  Genitourinary: Negative for decreased urine volume and urgency  Musculoskeletal: Negative  Negative for arthralgias, back pain and gait problem  Skin: Negative for rash and wound  Allergic/Immunologic: Negative  Neurological: Negative for dizziness, seizures, syncope, weakness, light-headedness and headaches  Hematological: Negative  Psychiatric/Behavioral: Negative for agitation and confusion  The patient is not nervous/anxious          Historical Information   Past Medical History:   Diagnosis Date   • Diarrhea    • Gall stones    • GERD (gastroesophageal reflux disease)    • Irregular heart beat     had some issues 2017 and had a cardiac workup done including 2 stress tests which were negative,     Past Surgical History:   Procedure Laterality Date   • BREAST SURGERY Right    • COLONOSCOPY     • FRACTURE SURGERY Left     pins in left elbow-removed   • CT LAP,CHOLECYSTECTOMY N/A 12/28/2021    Procedure: CHOLECYSTECTOMY LAPAROSCOPIC;  Surgeon: Buzz Morejon MD;  Location: [facility];  Service: General     Social History     Substance and Sexual Activity   Alcohol Use Not Currently    Comment: rarely     Social History     Substance and Sexual Activity   Drug Use Never     Social History     Tobacco Use   Smoking Status Never Smoker   Smokeless Tobacco Never Used     Family History:   Family History   Problem Relation Age of Onset   • Substance Abuse Maternal Aunt    • Alcohol abuse Maternal Aunt    • Pancreatic cancer Maternal Uncle    • KAT disease Mother        Meds/Allergies     Allergies   Allergen Reactions   • Sulfa Antibiotics Hives       Current Outpatient Medications:   •  multivitamin (THERAGRAN) TABS, Take 1 tablet by mouth daily, Disp: , Rfl:     Vitals: Blood pressure 110/80, pulse 71, temperature (!) 97 °F (36 1 °C), height 5' 6" (1 676 m), weight 108 kg (238 lb), SpO2 99 %  ?  Body mass index is 38 41 kg/m²  Wt Readings from Last 3 Encounters:   10/20/22 108 kg (238 lb)   09/26/22 108 kg (237 lb 8 oz)   04/08/22 107 kg (236 lb)     Vitals:    10/20/22 1135   Weight: 108 kg (238 lb)     BP Readings from Last 3 Encounters:   10/20/22 110/80   09/26/22 110/70   04/08/22 130/74         Physical Exam  Constitutional:       General: She is not in acute distress  Appearance: She is well-developed  She is not diaphoretic  Neck:      Thyroid: No thyromegaly  Vascular: No JVD  Cardiovascular:      Rate and Rhythm: Normal rate and regular rhythm  Pulses: Normal pulses  Heart sounds: Normal heart sounds  Pulmonary:      Effort: Pulmonary effort is normal  No respiratory distress  Breath sounds: Normal breath sounds  No wheezing or rales  Abdominal:      General: There is no distension  Palpations: Abdomen is soft  Tenderness: There is no abdominal tenderness  There is no rebound  Musculoskeletal:         General: No tenderness  Normal range of motion  Cervical back: Normal range of motion and neck supple  Skin:     General: Skin is warm and dry  Neurological:      Mental Status: She is alert and oriented to person, place, and time     Psychiatric: Behavior: Behavior normal          Judgment: Judgment normal            Diagnostic Studies Review Cardio:     exercise stress test done December 2021 patient exercise into   Stage III  exercise 9 minute and achieved workload of 27 7 metabolic equivalents  She has no symptoms no EKG changes  Patient has extensive cardiac workup done in the form of stress test which was nuclear and echo in [facility] area with cardiologist we are trying to get record it was done about 3 and half years ago  EKG:  Twelve lead EKG done 12/15/2021 shows normal sinus rhythm heart rate 61 beats per minute  QS in V1 to V3 most likely lead location no other significant abnormality     Twelve lead EKG done 10/20/2022 normal sinus rhythm heart rate 71 beats per minute no change from previous EKG  Lab Review   Lab Results   Component Value Date    WBC 5 94 09/26/2022    HGB 12 8 09/26/2022    HCT 39 6 09/26/2022    MCV 98 09/26/2022    RDW 13 2 09/26/2022     09/26/2022     BMP:  Lab Results   Component Value Date    SODIUM 140 09/26/2022    K 3 8 09/26/2022     09/26/2022    CO2 27 09/26/2022    BUN 10 09/26/2022    CREATININE 0 95 09/26/2022    GLUC 89 09/22/2021    GLUF 86 09/26/2022    CALCIUM 8 4 09/26/2022    EGFR 72 09/26/2022     LFT:  Lab Results   Component Value Date    AST 17 09/26/2022    ALT 35 09/26/2022    ALKPHOS 95 09/26/2022    TP 7 6 09/26/2022    ALB 3 8 09/26/2022        The 10-year ASCVD risk score (Beto Brennan et al , 2013) is: 1 4%    Values used to calculate the score:      Age: 41 years      Sex: Female      Is Non- : No      Diabetic: No      Tobacco smoker: No      Systolic Blood Pressure: 229 mmHg      Is BP treated: No      HDL Cholesterol: 35 mg/dL      Total Cholesterol: 211 mg/dL    Dr Shikha Epstein MD [facility] [place]      "This note has been constructed using a voice recognition system  Therefore there may be syntax, spelling, and/or grammatical errors  Please call if you have any questions  "

## 2023-05-02 ENCOUNTER — LAB (OUTPATIENT)
Dept: LAB | Facility: HOSPITAL | Age: 48
End: 2023-05-02
Attending: FAMILY MEDICINE

## 2023-05-02 ENCOUNTER — APPOINTMENT (OUTPATIENT)
Dept: RADIOLOGY | Facility: CLINIC | Age: 48
End: 2023-05-02

## 2023-05-02 ENCOUNTER — OFFICE VISIT (OUTPATIENT)
Dept: FAMILY MEDICINE CLINIC | Facility: CLINIC | Age: 48
End: 2023-05-02

## 2023-05-02 VITALS
DIASTOLIC BLOOD PRESSURE: 80 MMHG | RESPIRATION RATE: 16 BRPM | HEIGHT: 66 IN | TEMPERATURE: 98.4 F | BODY MASS INDEX: 38.25 KG/M2 | WEIGHT: 238 LBS | HEART RATE: 74 BPM | OXYGEN SATURATION: 99 % | SYSTOLIC BLOOD PRESSURE: 126 MMHG

## 2023-05-02 DIAGNOSIS — R10.11 RUQ ABDOMINAL PAIN: ICD-10-CM

## 2023-05-02 DIAGNOSIS — M54.9 UPPER BACK PAIN: ICD-10-CM

## 2023-05-02 DIAGNOSIS — Z12.31 ENCOUNTER FOR SCREENING MAMMOGRAM FOR BREAST CANCER: ICD-10-CM

## 2023-05-02 DIAGNOSIS — M54.9 UPPER BACK PAIN: Primary | ICD-10-CM

## 2023-05-02 LAB
ALBUMIN SERPL BCP-MCNC: 4 G/DL (ref 3.5–5)
ALP SERPL-CCNC: 77 U/L (ref 34–104)
ALT SERPL W P-5'-P-CCNC: 23 U/L (ref 7–52)
ANION GAP SERPL CALCULATED.3IONS-SCNC: 7 MMOL/L (ref 4–13)
AST SERPL W P-5'-P-CCNC: 18 U/L (ref 13–39)
BASOPHILS # BLD AUTO: 0.03 THOUSANDS/ΜL (ref 0–0.1)
BASOPHILS NFR BLD AUTO: 1 % (ref 0–1)
BILIRUB SERPL-MCNC: 0.48 MG/DL (ref 0.2–1)
BUN SERPL-MCNC: 12 MG/DL (ref 5–25)
CALCIUM SERPL-MCNC: 8.2 MG/DL (ref 8.4–10.2)
CHLORIDE SERPL-SCNC: 106 MMOL/L (ref 96–108)
CO2 SERPL-SCNC: 25 MMOL/L (ref 21–32)
CREAT SERPL-MCNC: 0.87 MG/DL (ref 0.6–1.3)
EOSINOPHIL # BLD AUTO: 0.36 THOUSAND/ΜL (ref 0–0.61)
EOSINOPHIL NFR BLD AUTO: 6 % (ref 0–6)
ERYTHROCYTE [DISTWIDTH] IN BLOOD BY AUTOMATED COUNT: 12.8 % (ref 11.6–15.1)
GFR SERPL CREATININE-BSD FRML MDRD: 80 ML/MIN/1.73SQ M
GLUCOSE P FAST SERPL-MCNC: 80 MG/DL (ref 65–99)
HCT VFR BLD AUTO: 37.3 % (ref 34.8–46.1)
HGB BLD-MCNC: 12.5 G/DL (ref 11.5–15.4)
IMM GRANULOCYTES # BLD AUTO: 0.02 THOUSAND/UL (ref 0–0.2)
IMM GRANULOCYTES NFR BLD AUTO: 0 % (ref 0–2)
LIPASE SERPL-CCNC: 20 U/L (ref 11–82)
LYMPHOCYTES # BLD AUTO: 2.62 THOUSANDS/ΜL (ref 0.6–4.47)
LYMPHOCYTES NFR BLD AUTO: 41 % (ref 14–44)
MCH RBC QN AUTO: 32.9 PG (ref 26.8–34.3)
MCHC RBC AUTO-ENTMCNC: 33.5 G/DL (ref 31.4–37.4)
MCV RBC AUTO: 98 FL (ref 82–98)
MONOCYTES # BLD AUTO: 0.37 THOUSAND/ΜL (ref 0.17–1.22)
MONOCYTES NFR BLD AUTO: 6 % (ref 4–12)
NEUTROPHILS # BLD AUTO: 2.93 THOUSANDS/ΜL (ref 1.85–7.62)
NEUTS SEG NFR BLD AUTO: 46 % (ref 43–75)
NRBC BLD AUTO-RTO: 0 /100 WBCS
PLATELET # BLD AUTO: 303 THOUSANDS/UL (ref 149–390)
PMV BLD AUTO: 8.4 FL (ref 8.9–12.7)
POTASSIUM SERPL-SCNC: 3.5 MMOL/L (ref 3.5–5.3)
PROT SERPL-MCNC: 7 G/DL (ref 6.4–8.4)
RBC # BLD AUTO: 3.8 MILLION/UL (ref 3.81–5.12)
SODIUM SERPL-SCNC: 138 MMOL/L (ref 135–147)
WBC # BLD AUTO: 6.33 THOUSAND/UL (ref 4.31–10.16)

## 2023-05-02 RX ORDER — DICYCLOMINE HCL 20 MG
20 TABLET ORAL EVERY 6 HOURS
Qty: 30 TABLET | Refills: 0 | Status: SHIPPED | OUTPATIENT
Start: 2023-05-02

## 2023-05-02 NOTE — PROGRESS NOTES
Nathan Mckeon 1975 female MRN: [id number]    FAMILY PRACTICE OFFICE VISIT  [facility] - [address]      ASSESSMENT/PLAN  Nathan Mckeon is a 57 y o  female presents to the office for    Diagnoses and all orders for this visit:    Upper back pain  -     XR spine thoracic 3 vw; Future    Encounter for screening mammogram for breast cancer  -     Mammo screening bilateral w 3d & cad; Future    RUQ abdominal pain  -     US right upper quadrant; Future  -     Comprehensive metabolic panel; Future  -     Lipase; Future  -     CBC and differential; Future  -     dicyclomine (BENTYL) 20 mg tablet; Take 1 tablet (20 mg total) by mouth every 6 (six) hours     Upper back pain: Unsure if it is secondary to right upper quadrant radiating to the back  Would like to rule out gallstone lodged after gallbladder removal   Rare but can happen  Sent for all basic labs to be sure there is no other etiology  Sent for an x-ray of the lower back to rule out disc herniation  Place the patient on Bentyl as needed for the symptoms           Future Appointments   Date Time Provider Coco Monsalve   5/8/2023 10:30 AM [place] US 1 Thiagoneris Barajas [address]   7/20/2023  8:30 AM [place] MAMMO 2 [address] [place] [address]  CC: Pain (RUQ pain under ribs after eating, diarrhea, intermittently since gallbladder removal 1 year ago )      HPI:  Nathan Mckeon is a 57 y o  female who presents for an acute appointment  Patient states that the last time we saw each other was in September and for about a year she has been having intermittent pain coming from her gallbladder since removal   She states that the pain comes and goes but for the last month has been persistent    She states that she expects a diarrhea after the gallbladder removal but she has been having pains under her rib after eating and its been worsening and would like to be evaluated         Review of Systems   Constitutional: Negative for activity "change, appetite change, chills, fatigue and fever  HENT: Negative for congestion  Respiratory: Negative for cough, chest tightness and shortness of breath  Cardiovascular: Negative for chest pain and leg swelling  Gastrointestinal: Positive for abdominal pain and diarrhea  Negative for abdominal distention, constipation, nausea and vomiting  All other systems reviewed and are negative  Historical Information   The patient history was reviewed as follows:  Past Medical History:   Diagnosis Date    Diarrhea     Gall stones     GERD (gastroesophageal reflux disease)     Irregular heart beat     had some issues 2017 and had a cardiac workup done including 2 stress tests which were negative,         Medications:     Current Outpatient Medications:     dicyclomine (BENTYL) 20 mg tablet, Take 1 tablet (20 mg total) by mouth every 6 (six) hours, Disp: 30 tablet, Rfl: 0    multivitamin (THERAGRAN) TABS, Take 1 tablet by mouth daily, Disp: , Rfl:     Allergies   Allergen Reactions    Sulfa Antibiotics Hives       OBJECTIVE  Vitals:   Vitals:    05/02/23 1122   BP: 126/80   BP Location: Left arm   Patient Position: Sitting   Cuff Size: Large   Pulse: 74   Resp: 16   Temp: 98 4 °F (36 9 °C)   SpO2: 99%   Weight: 108 kg (238 lb)   Height: 5' 6\" (1 676 m)         Physical Exam  Constitutional:       Appearance: Normal appearance  Pulmonary:      Effort: Pulmonary effort is normal    Abdominal:      Tenderness: There is abdominal tenderness (RUQ pain that wraps to the back)  Musculoskeletal:         General: Tenderness (upper back thoracici pain) present  Normal range of motion  Neurological:      General: No focal deficit present  Mental Status: She is alert and oriented to person, place, and time  Psychiatric:         Mood and Affect: Mood normal          Behavior: Behavior normal          Thought Content:  Thought content normal          Judgment: Judgment normal                     Makayla " Asha Bhatia MD,   [facility]  5/3/2023

## 2023-05-08 ENCOUNTER — HOSPITAL ENCOUNTER (OUTPATIENT)
Dept: RADIOLOGY | Facility: HOSPITAL | Age: 48
Discharge: HOME | End: 2023-05-08
Attending: FAMILY MEDICINE

## 2023-05-08 DIAGNOSIS — R10.11 RUQ ABDOMINAL PAIN: ICD-10-CM

## 2023-07-24 ENCOUNTER — HOSPITAL ENCOUNTER (OUTPATIENT)
Dept: RADIOLOGY | Facility: HOSPITAL | Age: 48
Discharge: HOME | End: 2023-07-24
Attending: FAMILY MEDICINE
Payer: COMMERCIAL

## 2023-07-24 VITALS — BODY MASS INDEX: 38.25 KG/M2 | WEIGHT: 238 LBS | HEIGHT: 66 IN

## 2023-07-24 DIAGNOSIS — Z12.31 ENCOUNTER FOR SCREENING MAMMOGRAM FOR BREAST CANCER: ICD-10-CM

## 2023-07-24 PROCEDURE — 77063 BREAST TOMOSYNTHESIS BI: CPT

## 2023-07-24 PROCEDURE — 77067 SCR MAMMO BI INCL CAD: CPT

## 2023-08-23 ENCOUNTER — HOSPITAL ENCOUNTER (OUTPATIENT)
Dept: RADIOLOGY | Facility: HOSPITAL | Age: 48
Discharge: HOME | End: 2023-08-23
Attending: FAMILY MEDICINE
Payer: COMMERCIAL

## 2023-08-23 DIAGNOSIS — R92.8 ABNORMAL SCREENING MAMMOGRAM: ICD-10-CM

## 2023-08-23 PROCEDURE — G0279 TOMOSYNTHESIS, MAMMO: HCPCS

## 2023-08-23 PROCEDURE — 76642 ULTRASOUND BREAST LIMITED: CPT

## 2023-08-23 PROCEDURE — 77066 DX MAMMO INCL CAD BI: CPT

## 2023-08-24 DIAGNOSIS — R92.8 ABNORMAL MAMMOGRAM: Primary | ICD-10-CM

## 2023-11-16 ENCOUNTER — OFFICE VISIT (OUTPATIENT)
Dept: CARDIOLOGY CLINIC | Facility: CLINIC | Age: 48
End: 2023-11-16
Payer: COMMERCIAL

## 2023-11-16 ENCOUNTER — TELEPHONE (OUTPATIENT)
Dept: CARDIOLOGY CLINIC | Facility: CLINIC | Age: 48
End: 2023-11-16

## 2023-11-16 ENCOUNTER — APPOINTMENT (OUTPATIENT)
Dept: LAB | Facility: HOSPITAL | Age: 48
End: 2023-11-16
Attending: INTERNAL MEDICINE
Payer: COMMERCIAL

## 2023-11-16 VITALS
WEIGHT: 242 LBS | DIASTOLIC BLOOD PRESSURE: 82 MMHG | SYSTOLIC BLOOD PRESSURE: 124 MMHG | HEIGHT: 66 IN | HEART RATE: 60 BPM | BODY MASS INDEX: 38.89 KG/M2 | OXYGEN SATURATION: 99 %

## 2023-11-16 DIAGNOSIS — Z90.49 HISTORY OF CHOLECYSTECTOMY: ICD-10-CM

## 2023-11-16 DIAGNOSIS — E78.5 DYSLIPIDEMIA: ICD-10-CM

## 2023-11-16 DIAGNOSIS — E66.9 OBESITY (BMI 30-39.9): ICD-10-CM

## 2023-11-16 DIAGNOSIS — R00.2 PALPITATIONS: ICD-10-CM

## 2023-11-16 LAB
ALBUMIN SERPL BCP-MCNC: 4.2 G/DL (ref 3.5–5)
ALP SERPL-CCNC: 79 U/L (ref 34–104)
ALT SERPL W P-5'-P-CCNC: 19 U/L (ref 7–52)
ANION GAP SERPL CALCULATED.3IONS-SCNC: 8 MMOL/L
AST SERPL W P-5'-P-CCNC: 15 U/L (ref 13–39)
BASOPHILS # BLD AUTO: 0.04 THOUSANDS/ÂΜL (ref 0–0.1)
BASOPHILS NFR BLD AUTO: 1 % (ref 0–1)
BILIRUB SERPL-MCNC: 0.65 MG/DL (ref 0.2–1)
BUN SERPL-MCNC: 9 MG/DL (ref 5–25)
CALCIUM SERPL-MCNC: 8.7 MG/DL (ref 8.4–10.2)
CHLORIDE SERPL-SCNC: 106 MMOL/L (ref 96–108)
CHOLEST SERPL-MCNC: 201 MG/DL
CO2 SERPL-SCNC: 24 MMOL/L (ref 21–32)
CREAT SERPL-MCNC: 0.94 MG/DL (ref 0.6–1.3)
EOSINOPHIL # BLD AUTO: 0.24 THOUSAND/ÂΜL (ref 0–0.61)
EOSINOPHIL NFR BLD AUTO: 5 % (ref 0–6)
ERYTHROCYTE [DISTWIDTH] IN BLOOD BY AUTOMATED COUNT: 12.6 % (ref 11.6–15.1)
GFR SERPL CREATININE-BSD FRML MDRD: 72 ML/MIN/1.73SQ M
GLUCOSE P FAST SERPL-MCNC: 90 MG/DL (ref 65–99)
HCT VFR BLD AUTO: 37.5 % (ref 34.8–46.1)
HDLC SERPL-MCNC: 38 MG/DL
HGB BLD-MCNC: 12.7 G/DL (ref 11.5–15.4)
IMM GRANULOCYTES # BLD AUTO: 0.01 THOUSAND/UL (ref 0–0.2)
IMM GRANULOCYTES NFR BLD AUTO: 0 % (ref 0–2)
LDLC SERPL CALC-MCNC: 141 MG/DL (ref 0–100)
LYMPHOCYTES # BLD AUTO: 1.89 THOUSANDS/ÂΜL (ref 0.6–4.47)
LYMPHOCYTES NFR BLD AUTO: 37 % (ref 14–44)
MCH RBC QN AUTO: 33.5 PG (ref 26.8–34.3)
MCHC RBC AUTO-ENTMCNC: 33.9 G/DL (ref 31.4–37.4)
MCV RBC AUTO: 99 FL (ref 82–98)
MONOCYTES # BLD AUTO: 0.24 THOUSAND/ÂΜL (ref 0.17–1.22)
MONOCYTES NFR BLD AUTO: 5 % (ref 4–12)
NEUTROPHILS # BLD AUTO: 2.76 THOUSANDS/ÂΜL (ref 1.85–7.62)
NEUTS SEG NFR BLD AUTO: 52 % (ref 43–75)
NONHDLC SERPL-MCNC: 163 MG/DL
NRBC BLD AUTO-RTO: 0 /100 WBCS
PLATELET # BLD AUTO: 291 THOUSANDS/UL (ref 149–390)
PMV BLD AUTO: 8.4 FL (ref 8.9–12.7)
POTASSIUM SERPL-SCNC: 4 MMOL/L (ref 3.5–5.3)
PROT SERPL-MCNC: 7.1 G/DL (ref 6.4–8.4)
RBC # BLD AUTO: 3.79 MILLION/UL (ref 3.81–5.12)
SODIUM SERPL-SCNC: 138 MMOL/L (ref 135–147)
TRIGL SERPL-MCNC: 111 MG/DL
TSH SERPL DL<=0.05 MIU/L-ACNC: 3.64 UIU/ML (ref 0.45–4.5)
WBC # BLD AUTO: 5.18 THOUSAND/UL (ref 4.31–10.16)

## 2023-11-16 PROCEDURE — 99214 OFFICE O/P EST MOD 30 MIN: CPT | Performed by: INTERNAL MEDICINE

## 2023-11-16 PROCEDURE — 85025 COMPLETE CBC W/AUTO DIFF WBC: CPT

## 2023-11-16 PROCEDURE — 80053 COMPREHEN METABOLIC PANEL: CPT

## 2023-11-16 PROCEDURE — 80061 LIPID PANEL: CPT

## 2023-11-16 PROCEDURE — 84443 ASSAY THYROID STIM HORMONE: CPT

## 2023-11-16 PROCEDURE — 36415 COLL VENOUS BLD VENIPUNCTURE: CPT

## 2023-11-16 PROCEDURE — 93000 ELECTROCARDIOGRAM COMPLETE: CPT | Performed by: INTERNAL MEDICINE

## 2023-11-16 RX ORDER — DOXYCYCLINE 50 MG/1
50 TABLET ORAL DAILY
COMMUNITY
Start: 2023-10-23

## 2023-11-16 NOTE — TELEPHONE ENCOUNTER
----- Message from Kerline Loera MD sent at 11/16/2023 12:40 PM EST -----  Her blood test reviewed cholesterol slightly better than before. Other labs are acceptable. She should keep working on her diet and start exercising.

## 2023-11-16 NOTE — PROGRESS NOTES
progress note- Cardiology Office  [facility]. Janine Ferrer 57 y.o. female MRN: [id number]  : 1975  Unit/Bed#:  Encounter: [id number]      Assessment:     1. Palpitations    2. Obesity (BMI 30-39.9)    3. Dyslipidemia    4. History of cholecystectomy        Discussion summary and Plan:    1. Dyslipidemia with low HDL and high LDL. Encouraged her to start exercise program.  Her risk for cardiovascular event at this time is low around 1.5%. She has started to exercise program.  Hopefully we will get a repeat blood test based on that we will decide further action. 2.  Palpitations she had history of atrial palpitations in the past.  Holter monitor shows no evidence of tachy-liana arrhythmia exercise stress was okay echo done a year ago [facility] acceptable. Change in symptoms of palpitations. 3.   History of anxiety. Patient was reassured Lifestyle modification recommended on advised to cut back on caffeine. 4.   Obesity with BMI around 39. He has class II obesity with a BMI around 39 encouraged her to lose weight dietary changes advised. 5.  History of cholecystectomy    Follow-up 1 year      Patient  was advised and educated to call our office  immediately if  patient has any new symptoms of chest pain/shortness of breath, near-syncope, syncope, light headedness sustained palpitations  or any other cardiovascular symptoms before their scheduled follow-up appointment. Office #[phone]. Thank you for your consultation. If you have any question please call me at [phone]    Counseling :  A description of the counseling. Goals and Barriers. Patient's ability to self care: Yes  Medication side effect reviewed with patient in detail and all their questions answered to their satisfaction. Primary Care Physician : Maria G Chavez MD    HPI :     Janine Ferrer is a 57y.o. year old female who was referred   To us for preoperative clearance. Patient who has medical history significant for gallstone on ultrasound, questionable history of atrial flutter as per surgical note, unexplained diarrhea, obesity with BMI around 37, recurrent history of epigastric discomfort/chest pain was sent to us for preoperative clearance. She has ultrasound done of her abdomen which shows cholelithiasis. She had blood work done in September 2021 electrolytes were acceptable. TSH was acceptable. Her pregnancy test was negative in December 21. Lately she has noted some epigastric discomfort which was more in her right upper quadrant area and workup has shown gallstone. She had history of palpitation and flutter where she has extensive cardiac workup done 3 years ago which was in the form of echo Doppler as well as a nuclear stress test and monitoring and workup was found to be negative. Workup was done in [facility] trying to get those records. She denies any chest pain any shortness of breath no other issues. Has epigastric discomfort as mentioned above. She does not smoke. There is no family history of premature coronary artery disease both her parents and siblings are okay. She is  she has 3 kids and she is very active with them. 04/08/2022. Above reviewed. Patient came for follow-up. She was initially seen for preoperative clearance at that time she had gallbladder bladder issues she was having chest discomfort and her exercise stress was normal.  She is doing well otherwise some palpitations. She does get easily anxious. She had episodes of palpitation which are only for short duration she never felt she is going to pass out or dizziness or lightheadedness. No nausea no vomiting no fever no chills. She has cardiac workup done [place] few years ago echo was told normal LV systolic function and stress test has been okay. No other issues at this time. 10/20/2022. Above reviewed.   Patient came for follow-up she is doing well. She had no more episodes of chest discomfort a still occasionally get palpitations. Holter monitor shows no evidence of significant tachy-liana arrhythmias. And her stress test was normal.  She denies any new complaints EKG shows sinus rhythm heart rate 71 beats per minute no nausea no vomiting no fever no chills no PND no orthopnea no other cardiac issues. She has echo done few years ago which was normal LV systolic function and no other cardiovascular issues. 11/16/2023. Reviewed. Patient came for follow-up she is doing well she has no evidence of any significant tacky bradycardia arrhythmia on her previous Holter monitor. Today EKG shows heart rate 60 bpm it is normal EKG. She does have a history of palpitation and a remote 1 episode of flutter with no documented proof. Her BMI remains around 39 her last blood test for cholesterol was in September 2022 where cholesterol was 220 with HDL of 35. She has no new complaints and no cardiovascular issues. Vitals has been stable. Labs from May and September 2022 reviewed. Review of Systems   Constitutional:  Negative for activity change, chills, diaphoresis, fever and unexpected weight change. HENT:  Negative for congestion. Eyes:  Negative for discharge and redness. Respiratory:  Negative for cough, chest tightness, shortness of breath and wheezing. Cardiovascular: Negative. Negative for chest pain, palpitations and leg swelling. Gastrointestinal:  Negative for abdominal pain, diarrhea and nausea. Endocrine: Negative. Genitourinary:  Negative for decreased urine volume and urgency. Musculoskeletal: Negative. Negative for arthralgias, back pain and gait problem. Skin:  Negative for rash and wound. Allergic/Immunologic: Negative. Neurological:  Negative for dizziness, seizures, syncope, weakness, light-headedness and headaches. Hematological: Negative.     Psychiatric/Behavioral:  Negative for agitation and confusion. The patient is not nervous/anxious. Historical Information   Past Medical History:   Diagnosis Date   • Diarrhea    • Gall stones    • GERD (gastroesophageal reflux disease)    • Irregular heart beat     had some issues 2017 and had a cardiac workup done including 2 stress tests which were negative,     Past Surgical History:   Procedure Laterality Date   • BREAST CYST EXCISION Right 01/1994    fibroadenoma removed   • BREAST SURGERY Right    • COLONOSCOPY     • FRACTURE SURGERY Left     pins in left elbow-removed   • RI LAPAROSCOPY SURG CHOLECYSTECTOMY N/A 12/28/2021    Procedure: CHOLECYSTECTOMY LAPAROSCOPIC;  Surgeon: Felicia Cortes MD;  Location: [facility] OR;  Service: General     Social History     Substance and Sexual Activity   Alcohol Use Not Currently    Comment: rarely     Social History     Substance and Sexual Activity   Drug Use Never     Social History     Tobacco Use   Smoking Status Never   Smokeless Tobacco Never     Family History:   Family History   Problem Relation Age of Onset   • Substance Abuse Maternal Aunt    • Alcohol abuse Maternal Aunt    • Pancreatic cancer Maternal Uncle    • KAT disease Mother        Meds/Allergies     Allergies   Allergen Reactions   • Sulfa Antibiotics Hives       Current Outpatient Medications:   •  doxycycline (ADOXA) 50 MG tablet, Take 50 mg by mouth daily With food, Disp: , Rfl:   •  multivitamin (THERAGRAN) TABS, Take 1 tablet by mouth daily, Disp: , Rfl:     Vitals: Blood pressure 124/82, pulse 60, height 5' 6" (1.676 m), weight 110 kg (242 lb), SpO2 99 %. ?  Body mass index is 39.06 kg/m². Wt Readings from Last 3 Encounters:   11/16/23 110 kg (242 lb)   07/24/23 108 kg (238 lb)   05/02/23 108 kg (238 lb)     Vitals:    11/16/23 0947   Weight: 110 kg (242 lb)     BP Readings from Last 3 Encounters:   11/16/23 124/82   05/02/23 126/80   10/20/22 110/80         Physical Exam  Constitutional:       General: She is not in acute distress. Appearance: She is well-developed. She is not diaphoretic. Neck:      Thyroid: No thyromegaly. Vascular: No JVD. Cardiovascular:      Rate and Rhythm: Normal rate and regular rhythm. Pulses: Normal pulses. Heart sounds: Normal heart sounds. Pulmonary:      Effort: Pulmonary effort is normal. No respiratory distress. Breath sounds: Normal breath sounds. No wheezing or rales. Abdominal:      General: There is no distension. Palpations: Abdomen is soft. Tenderness: There is no abdominal tenderness. There is no rebound. Musculoskeletal:         General: No tenderness. Normal range of motion. Cervical back: Normal range of motion and neck supple. Skin:     General: Skin is warm and dry. Neurological:      Mental Status: She is alert and oriented to person, place, and time. Psychiatric:         Behavior: Behavior normal.         Judgment: Judgment normal.           Diagnostic Studies Review Cardio:     exercise stress test done December 2021 patient exercise into   Stage III  exercise 9 minute and achieved workload of 54.4 metabolic equivalents. She has no symptoms no EKG changes. Patient has extensive cardiac workup done in the form of stress test which was nuclear and echo in [facility] area with cardiologist we are trying to get record it was done about 3 and half years ago. EKG:  Twelve lead EKG done 12/15/2021 shows normal sinus rhythm heart rate 61 beats per minute. QS in V1 to V3 most likely lead location no other significant abnormality     Twelve lead EKG done 10/20/2022 normal sinus rhythm heart rate 71 beats per minute no change from previous EKG.     Twelve-lead EKG done on 11/16/2023 shows normal sinus some heart rate 60 bpm no change from previous EKG        Lab Review   Lab Results   Component Value Date    WBC 6.33 05/02/2023    HGB 12.5 05/02/2023    HCT 37.3 05/02/2023    MCV 98 05/02/2023    RDW 12.8 05/02/2023     05/02/2023 BMP:  Lab Results   Component Value Date    SODIUM 138 05/02/2023    K 3.5 05/02/2023     05/02/2023    CO2 25 05/02/2023    BUN 12 05/02/2023    CREATININE 0.87 05/02/2023    GLUC 89 09/22/2021    GLUF 80 05/02/2023    CALCIUM 8.2 (L) 05/02/2023    EGFR 80 05/02/2023     LFT:  Lab Results   Component Value Date    AST 18 05/02/2023    ALT 23 05/02/2023    ALKPHOS 77 05/02/2023    TP 7.0 05/02/2023    ALB 4.0 05/02/2023        The 10-year ASCVD risk score (Cullen CABA, et al., 2019) is: 1.8%    Values used to calculate the score:      Age: 57 years      Sex: Female      Is Non- : No      Diabetic: No      Tobacco smoker: No      Systolic Blood Pressure: 887 mmHg      Is BP treated: No      HDL Cholesterol: 35 mg/dL      Total Cholesterol: 211 mg/dL    Dr. Evangelist Haddad MD [facility]      "This note has been constructed using a voice recognition system. Therefore there may be syntax, spelling, and/or grammatical errors.  Please call if you have any questions. "

## 2024-04-16 ENCOUNTER — TELEPHONE (OUTPATIENT)
Age: 49
End: 2024-04-16

## 2024-04-16 DIAGNOSIS — Z12.31 ENCOUNTER FOR SCREENING MAMMOGRAM FOR BREAST CANCER: Primary | ICD-10-CM

## 2024-04-16 NOTE — TELEPHONE ENCOUNTER
Please place order for mammo and/or ultrasond of breast for the patient.  Notify the patient when complete.

## 2024-04-17 DIAGNOSIS — Z12.31 ENCOUNTER FOR SCREENING MAMMOGRAM FOR BREAST CANCER: Primary | ICD-10-CM

## 2024-05-08 ENCOUNTER — TELEPHONE (OUTPATIENT)
Dept: MAMMOGRAPHY | Facility: CLINIC | Age: 49
End: 2024-05-08

## 2024-08-15 ENCOUNTER — HOSPITAL ENCOUNTER (OUTPATIENT)
Dept: RADIOLOGY | Facility: HOSPITAL | Age: 49
Discharge: HOME | End: 2024-08-15
Attending: FAMILY MEDICINE
Payer: COMMERCIAL

## 2024-08-15 DIAGNOSIS — R92.8 ABNORMAL MAMMOGRAM: ICD-10-CM

## 2024-08-15 PROCEDURE — 77066 DX MAMMO INCL CAD BI: CPT

## 2024-08-15 PROCEDURE — 76642 ULTRASOUND BREAST LIMITED: CPT

## 2024-08-15 PROCEDURE — G0279 TOMOSYNTHESIS, MAMMO: HCPCS

## 2025-08-19 ENCOUNTER — APPOINTMENT (OUTPATIENT)
Dept: LAB | Facility: CLINIC | Age: 50
End: 2025-08-19
Attending: FAMILY MEDICINE
Payer: COMMERCIAL

## 2025-08-19 DIAGNOSIS — Z00.00 ANNUAL PHYSICAL EXAM: ICD-10-CM

## 2025-08-19 DIAGNOSIS — N95.1 HOT FLUSHES, PERIMENOPAUSAL: ICD-10-CM

## 2025-08-19 DIAGNOSIS — E78.2 MIXED HYPERLIPIDEMIA: ICD-10-CM

## 2025-08-19 DIAGNOSIS — Z13.29 SCREENING FOR THYROID DISORDER: ICD-10-CM

## 2025-08-19 DIAGNOSIS — Z11.59 NEED FOR HEPATITIS C SCREENING TEST: ICD-10-CM

## 2025-08-19 DIAGNOSIS — Z13.0 SCREENING FOR DEFICIENCY ANEMIA: ICD-10-CM

## 2025-08-19 DIAGNOSIS — R53.82 CHRONIC FATIGUE: ICD-10-CM

## 2025-08-19 DIAGNOSIS — Z11.4 ENCOUNTER FOR SCREENING FOR HIV: ICD-10-CM

## 2025-08-19 LAB
25(OH)D3 SERPL-MCNC: 23.1 NG/ML (ref 30–100)
ALBUMIN SERPL BCG-MCNC: 4.3 G/DL (ref 3.5–5)
ALP SERPL-CCNC: 85 U/L (ref 34–104)
ALT SERPL W P-5'-P-CCNC: 20 U/L (ref 7–52)
ANION GAP SERPL CALCULATED.3IONS-SCNC: 4 MMOL/L (ref 4–13)
AST SERPL W P-5'-P-CCNC: 16 U/L (ref 13–39)
BILIRUB SERPL-MCNC: 0.52 MG/DL (ref 0.2–1)
BUN SERPL-MCNC: 9 MG/DL (ref 5–25)
CALCIUM SERPL-MCNC: 8.6 MG/DL (ref 8.4–10.2)
CHLORIDE SERPL-SCNC: 105 MMOL/L (ref 96–108)
CHOLEST SERPL-MCNC: 198 MG/DL (ref ?–200)
CO2 SERPL-SCNC: 28 MMOL/L (ref 21–32)
CREAT SERPL-MCNC: 0.83 MG/DL (ref 0.6–1.3)
ERYTHROCYTE [DISTWIDTH] IN BLOOD BY AUTOMATED COUNT: 12.5 % (ref 11.6–15.1)
EST. AVERAGE GLUCOSE BLD GHB EST-MCNC: 120 MG/DL
FSH SERPL-ACNC: 1.8 MIU/ML
GFR SERPL CREATININE-BSD FRML MDRD: 83 ML/MIN/1.73SQ M
GLUCOSE P FAST SERPL-MCNC: 90 MG/DL (ref 65–99)
HBA1C MFR BLD: 5.8 %
HCT VFR BLD AUTO: 38.4 % (ref 34.8–46.1)
HDLC SERPL-MCNC: 40 MG/DL
HGB BLD-MCNC: 12.8 G/DL (ref 11.5–15.4)
LDLC SERPL CALC-MCNC: 133 MG/DL (ref 0–100)
LH SERPL-ACNC: 2.6 MIU/ML
LIPASE SERPL-CCNC: 20 U/L (ref 11–82)
MAGNESIUM SERPL-MCNC: 2.3 MG/DL (ref 1.9–2.7)
MCH RBC QN AUTO: 32.4 PG (ref 26.8–34.3)
MCHC RBC AUTO-ENTMCNC: 33.3 G/DL (ref 31.4–37.4)
MCV RBC AUTO: 97 FL (ref 82–98)
PLATELET # BLD AUTO: 326 THOUSANDS/UL (ref 149–390)
PMV BLD AUTO: 8.9 FL (ref 8.9–12.7)
POTASSIUM SERPL-SCNC: 4.1 MMOL/L (ref 3.5–5.3)
PROT SERPL-MCNC: 7.5 G/DL (ref 6.4–8.4)
RBC # BLD AUTO: 3.95 MILLION/UL (ref 3.81–5.12)
SODIUM SERPL-SCNC: 137 MMOL/L (ref 135–147)
TRIGL SERPL-MCNC: 126 MG/DL (ref ?–150)
TSH SERPL DL<=0.05 MIU/L-ACNC: 3.93 UIU/ML (ref 0.45–4.5)
VIT B12 SERPL-MCNC: 71 PG/ML (ref 180–914)
WBC # BLD AUTO: 6.06 THOUSAND/UL (ref 4.31–10.16)

## 2025-08-19 PROCEDURE — 83690 ASSAY OF LIPASE: CPT

## 2025-08-19 PROCEDURE — 36415 COLL VENOUS BLD VENIPUNCTURE: CPT

## 2025-08-19 PROCEDURE — 80061 LIPID PANEL: CPT

## 2025-08-19 PROCEDURE — 83001 ASSAY OF GONADOTROPIN (FSH): CPT

## 2025-08-19 PROCEDURE — 83735 ASSAY OF MAGNESIUM: CPT

## 2025-08-19 PROCEDURE — 87389 HIV-1 AG W/HIV-1&-2 AB AG IA: CPT

## 2025-08-19 PROCEDURE — 86803 HEPATITIS C AB TEST: CPT

## 2025-08-19 PROCEDURE — 82607 VITAMIN B-12: CPT

## 2025-08-19 PROCEDURE — 85027 COMPLETE CBC AUTOMATED: CPT

## 2025-08-19 PROCEDURE — 83002 ASSAY OF GONADOTROPIN (LH): CPT

## 2025-08-19 PROCEDURE — 83036 HEMOGLOBIN GLYCOSYLATED A1C: CPT

## 2025-08-19 PROCEDURE — 84443 ASSAY THYROID STIM HORMONE: CPT

## 2025-08-19 PROCEDURE — 82306 VITAMIN D 25 HYDROXY: CPT

## 2025-08-19 PROCEDURE — 80053 COMPREHEN METABOLIC PANEL: CPT

## 2025-08-20 LAB
HCV AB SER QL: NORMAL
HIV 1+2 AB+HIV1 P24 AG SERPL QL IA: NORMAL

## (undated) DEVICE — SUT VICRYL PLUS 0 CT-3 27 IN VCP329H

## (undated) DEVICE — INTENDED FOR TISSUE SEPARATION, AND OTHER PROCEDURES THAT REQUIRE A SHARP SURGICAL BLADE TO PUNCTURE OR CUT.: Brand: BARD-PARKER SAFETY BLADES SIZE 15, STERILE

## (undated) DEVICE — SPONGE GAUZE 4 X 8 12 PLY STRL LF

## (undated) DEVICE — ASTOUND STANDARD SURGICAL GOWN, XL: Brand: CONVERTORS

## (undated) DEVICE — IRRIG ENDO FLO TUBING

## (undated) DEVICE — BASIC DOUBLE BASIN 2-LF: Brand: MEDLINE INDUSTRIES, INC.

## (undated) DEVICE — TROCAR: Brand: KII FIOS FIRST ENTRY

## (undated) DEVICE — 3M™ TEGADERM™ TRANSPARENT FILM DRESSING FRAME STYLE, 1624W, 2-3/8 IN X 2-3/4 IN (6 CM X 7 CM), 100/CT 4CT/CASE: Brand: 3M™ TEGADERM™

## (undated) DEVICE — TROCARS: Brand: KII® BALLOON BLUNT TIP SYSTEM

## (undated) DEVICE — PACK GENERAL LF

## (undated) DEVICE — 3M™ TEGADERM™ TRANSPARENT FILM DRESSING FRAME STYLE, 1626W, 4 IN X 4-3/4 IN (10 CM X 12 CM), 50/CT 4CT/CASE: Brand: 3M™ TEGADERM™

## (undated) DEVICE — TISSUE RETRIEVAL SYSTEM: Brand: INZII RETRIEVAL SYSTEM

## (undated) DEVICE — ENDO CLIP II 10MM PISTOL GRIP SINGLE USE CLIP APPLIER

## (undated) DEVICE — 3M™ STERI-STRIP™ REINFORCED ADHESIVE SKIN CLOSURES, R1547, 1/2 IN X 4 IN (12 MM X 100 MM), 6 STRIPS/ENVELOPE: Brand: 3M™ STERI-STRIP™

## (undated) DEVICE — GLOVE INDICATOR PI UNDERGLOVE SZ 7.5 BLUE

## (undated) DEVICE — SUT MONOCRYL 4-0 PC-5 18 IN Y823G

## (undated) DEVICE — GLOVE SRG BIOGEL 7

## (undated) DEVICE — SHEARS MONOPOL MINI 5MM X 31CM RATCHET HNDL

## (undated) DEVICE — ANTI-FOG SOLUTION WITH FOAM PAD: Brand: DEVON

## (undated) DEVICE — DRAPE UTILITY

## (undated) DEVICE — DRAPE LAPAROTOMY W/POUCHES

## (undated) DEVICE — TROCAR: Brand: KII SLEEVE

## (undated) DEVICE — LATEX FREE 10 FT  INSUFFLATION TUBING, COLDER CONNECTOR WITH 1 MICRON FILTER STERILE ONE TIME USE, 20 U: Brand: SURGICAL DIRECT

## (undated) DEVICE — SMOKE EVACUATION TUBING WITH 8 IN INTEGRAL WAND AND SPONGE GUARD: Brand: BUFFALO FILTER

## (undated) DEVICE — CHLORAPREP HI-LITE 26ML ORANGE